# Patient Record
Sex: FEMALE | Race: WHITE | NOT HISPANIC OR LATINO | Employment: FULL TIME | URBAN - METROPOLITAN AREA
[De-identification: names, ages, dates, MRNs, and addresses within clinical notes are randomized per-mention and may not be internally consistent; named-entity substitution may affect disease eponyms.]

---

## 2020-02-10 ENCOUNTER — OFFICE VISIT (OUTPATIENT)
Dept: UROLOGY | Facility: CLINIC | Age: 55
End: 2020-02-10
Payer: COMMERCIAL

## 2020-02-10 VITALS
DIASTOLIC BLOOD PRESSURE: 65 MMHG | HEIGHT: 69 IN | SYSTOLIC BLOOD PRESSURE: 108 MMHG | BODY MASS INDEX: 20.73 KG/M2 | WEIGHT: 140 LBS | HEART RATE: 71 BPM | RESPIRATION RATE: 20 BRPM

## 2020-02-10 DIAGNOSIS — N20.0 BILATERAL KIDNEY STONES: ICD-10-CM

## 2020-02-10 PROCEDURE — 99213 PR OFFICE/OUTPT VISIT, EST, LEVL III, 20-29 MIN: ICD-10-PCS | Mod: S$GLB,,, | Performed by: NURSE PRACTITIONER

## 2020-02-10 PROCEDURE — 3008F BODY MASS INDEX DOCD: CPT | Mod: CPTII,S$GLB,, | Performed by: NURSE PRACTITIONER

## 2020-02-10 PROCEDURE — 99213 OFFICE O/P EST LOW 20 MIN: CPT | Mod: S$GLB,,, | Performed by: NURSE PRACTITIONER

## 2020-02-10 PROCEDURE — 3008F PR BODY MASS INDEX (BMI) DOCUMENTED: ICD-10-PCS | Mod: CPTII,S$GLB,, | Performed by: NURSE PRACTITIONER

## 2020-02-10 RX ORDER — HYDROCHLOROTHIAZIDE 12.5 MG/1
12.5 CAPSULE ORAL DAILY
COMMUNITY
End: 2022-08-18 | Stop reason: SDUPTHER

## 2020-02-10 RX ORDER — MELATONIN 3 MG
CAPSULE ORAL
COMMUNITY
End: 2020-12-18

## 2020-02-10 RX ORDER — OMEPRAZOLE 40 MG/1
40 CAPSULE, DELAYED RELEASE ORAL DAILY
COMMUNITY

## 2020-02-10 NOTE — PROGRESS NOTES
Subjective:       Patient ID: Beena Levy is a 54 y.o. female.    Chief Complaint: No chief complaint on file.      HPI: 54-year-old  female known patient to Dr. Galvan with a history of bilateral nonobstructing renal calculi and nonspecific left flank pain.  She was seen in the emergency room on 2020 with continued intermittent left flank pain.  CT scan again identified bilateral nonobstructing renal stones.  No hydroureter no hydronephrosis.  On presentation today she has some mild discomfort.  She is prescription NSAID for abdominal pain by her PCP.  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  She is scheduled to see Dr. Huang gastroenterology 2020 for her abdominal pain and left flank pain.  She has had no urinary tract infections since last seen.  She is voiding without difficulty, denying dysuria, frequency, urgency, incontinence or gross hematuria.  No constipation.       Past Medical History:   Past Medical History:   Diagnosis Date    Colon abnormality     GERD (gastroesophageal reflux disease)     Hypertension     Renal calculi        Past Surgical Historical:   Past Surgical History:   Procedure Laterality Date    CERVICAL SPINE SURGERY       SECTION      COLON SURGERY      LUMBAR SPINE SURGERY      URETEROLITHOTOMY          Medications:   Medication List with Changes/Refills   Current Medications    HYDROCHLOROTHIAZIDE (MICROZIDE) 12.5 MG CAPSULE    Take 12.5 mg by mouth once daily.    MELATONIN 3 MG CAP    Take by mouth.    OMEPRAZOLE (PRILOSEC) 40 MG CAPSULE    Take 40 mg by mouth once daily.        Past Social History:   Social History     Socioeconomic History    Marital status:      Spouse name: Not on file    Number of children: Not on file    Years of education: Not on file    Highest education level: Not on file   Occupational History    Not on file   Social Needs    Financial resource strain: Not on file     Food insecurity:     Worry: Not on file     Inability: Not on file    Transportation needs:     Medical: Not on file     Non-medical: Not on file   Tobacco Use    Smoking status: Never Smoker   Substance and Sexual Activity    Alcohol use: Not Currently    Drug use: Never    Sexual activity: Not on file   Lifestyle    Physical activity:     Days per week: Not on file     Minutes per session: Not on file    Stress: Not on file   Relationships    Social connections:     Talks on phone: Not on file     Gets together: Not on file     Attends Baptism service: Not on file     Active member of club or organization: Not on file     Attends meetings of clubs or organizations: Not on file     Relationship status: Not on file   Other Topics Concern    Not on file   Social History Narrative    Not on file       Allergies: Review of patient's allergies indicates:  No Known Allergies     Family History: History reviewed. No pertinent family history.     Review of Systems:  Review of Systems   Constitutional: Negative for activity change and appetite change.   HENT: Negative for congestion and dental problem.    Respiratory: Negative for chest tightness and shortness of breath.    Cardiovascular: Negative for chest pain.   Gastrointestinal: Negative for abdominal distention.   Genitourinary: Negative for decreased urine volume, difficulty urinating, dyspareunia, dysuria, enuresis, flank pain, frequency, genital sores, hematuria, pelvic pain and urgency.   Musculoskeletal: Negative for back pain and neck pain.   Allergic/Immunologic: Negative for immunocompromised state.   Neurological: Negative for dizziness.   Hematological: Negative for adenopathy.   Psychiatric/Behavioral: Negative for agitation, behavioral problems and confusion.       Physical Exam:  Physical Exam   Constitutional: She is oriented to person, place, and time. She appears well-developed and well-nourished.   HENT:   Head: Normocephalic and  atraumatic.   Eyes: No scleral icterus.   Neck: Normal range of motion.   Cardiovascular: Intact distal pulses.    Pulmonary/Chest: Effort normal and breath sounds normal.   Abdominal: Soft. She exhibits no distension. There is no tenderness.   Genitourinary: Rectum normal and vagina normal. Pelvic exam was performed with patient supine. There is no rash, tenderness or lesion on the right labia. There is no rash, tenderness or lesion on the left labia.   Musculoskeletal: She exhibits no edema.   Neurological: She is alert and oriented to person, place, and time.   Skin: Skin is warm and dry.     Psychiatric: She has a normal mood and affect.       Assessment/Plan:   Bilateral renal calculi--nonobstructive by current CT scan February 2nd 2 1.  Urinalysis today negative WBCs, 0-2 RBCs per high-power field no epi / no bacteria.  Further workup planned at this time will see patient on as needed basis prior to her next appointment in 6 months, should she become symptomatic.  Problem List Items Addressed This Visit     None

## 2020-12-11 ENCOUNTER — TELEPHONE (OUTPATIENT)
Dept: UROLOGY | Facility: CLINIC | Age: 55
End: 2020-12-11

## 2020-12-11 NOTE — TELEPHONE ENCOUNTER
"In coming fax received from the kidney clinic, [facility] regarding mutual patient, please review following imaging.  Patient complaining of left-sided flank pain". Imaging report attached was CT abdomen/pelvis performed at [facility] on 12/10/2020 revealing multiple bilateral nonobstructing renal calyceal stones measuring 2-3 mm size.  Mild left hydronephrosis secondary to a 4 mm left UPJ stone.  No renal mass or perinephric fluid collection."  Upcoming appointment noted on 12/18/2020.  "

## 2020-12-18 ENCOUNTER — OFFICE VISIT (OUTPATIENT)
Dept: UROLOGY | Facility: CLINIC | Age: 55
End: 2020-12-18
Payer: COMMERCIAL

## 2020-12-18 VITALS
HEART RATE: 82 BPM | SYSTOLIC BLOOD PRESSURE: 117 MMHG | BODY MASS INDEX: 20.73 KG/M2 | HEIGHT: 69 IN | WEIGHT: 140 LBS | DIASTOLIC BLOOD PRESSURE: 72 MMHG

## 2020-12-18 DIAGNOSIS — N20.1 OBSTRUCTION OF LEFT URETEROPELVIC JUNCTION (UPJ) DUE TO STONE: Primary | ICD-10-CM

## 2020-12-18 DIAGNOSIS — N20.0 BILATERAL KIDNEY STONES: ICD-10-CM

## 2020-12-18 PROCEDURE — 99213 OFFICE O/P EST LOW 20 MIN: CPT | Mod: S$GLB,,, | Performed by: SPECIALIST

## 2020-12-18 PROCEDURE — 3008F PR BODY MASS INDEX (BMI) DOCUMENTED: ICD-10-PCS | Mod: CPTII,S$GLB,, | Performed by: SPECIALIST

## 2020-12-18 PROCEDURE — 3008F BODY MASS INDEX DOCD: CPT | Mod: CPTII,S$GLB,, | Performed by: SPECIALIST

## 2020-12-18 PROCEDURE — 99213 PR OFFICE/OUTPT VISIT, EST, LEVL III, 20-29 MIN: ICD-10-PCS | Mod: S$GLB,,, | Performed by: SPECIALIST

## 2020-12-19 RX ORDER — TAMSULOSIN HYDROCHLORIDE 0.4 MG/1
CAPSULE ORAL
Qty: 30 CAPSULE | Refills: 0 | Status: SHIPPED | OUTPATIENT
Start: 2020-12-19 | End: 2021-01-17

## 2020-12-19 NOTE — PROGRESS NOTES
Subjective:       Patient ID: Beena PASCUAL is a 55 y.o. female.    Chief Complaint: Nephrolithiasis      HPI:  55-year-old female very pleasant, she is well known to us.  She has an extensive history of nephrolithiasis.  Her last visit with me was February 10, 2020.  She was seen in a local emergency department on 2020 with continue left intermittent left flank pain is CT scan showed bilateral nonobstructing renal calculus stone burden worse on the left than on the right.    Recently she was seen by her local nephrologist.  She continues to have intermittent left flank discomfort.  She was sent for CT imaging performed on 12/10/2020.  There was evidence of a 4 mm left UPJ obstructing calculus.  There was mild hydronephrosis.  There were multiple nonobstructing stones in the left renal calices as well as a few on the right side.  She is here today for follow-up    Her symptoms are very mild.  She does not have costovertebral angle tenderness no fevers or chills no dysuria or hematuria.    Past Medical History:   Past Medical History:   Diagnosis Date    Colon abnormality     GERD (gastroesophageal reflux disease)     Hypertension     Renal calculi        Past Surgical Historical:   Past Surgical History:   Procedure Laterality Date    CERVICAL SPINE SURGERY       SECTION      COLON SURGERY      LUMBAR SPINE SURGERY      URETEROLITHOTOMY          Medications:   Medication List with Changes/Refills   New Medications    TAMSULOSIN (FLOMAX) 0.4 MG CAP    Take 1 tablet orally at bedtime   Current Medications    HYDROCHLOROTHIAZIDE (MICROZIDE) 12.5 MG CAPSULE    Take 12.5 mg by mouth once daily.    OMEPRAZOLE (PRILOSEC) 40 MG CAPSULE    Take 40 mg by mouth once daily.   Discontinued Medications    MELATONIN 3 MG CAP    Take by mouth.        Past Social History:   Social History     Socioeconomic History    Marital status:      Spouse name: Not on file    Number of children: Not on file     Years of education: Not on file    Highest education level: Not on file   Occupational History    Not on file   Social Needs    Financial resource strain: Not on file    Food insecurity     Worry: Not on file     Inability: Not on file    Transportation needs     Medical: Not on file     Non-medical: Not on file   Tobacco Use    Smoking status: Never Smoker    Smokeless tobacco: Never Used   Substance and Sexual Activity    Alcohol use: Not Currently    Drug use: Never    Sexual activity: Not on file   Lifestyle    Physical activity     Days per week: Not on file     Minutes per session: Not on file    Stress: Not on file   Relationships    Social connections     Talks on phone: Not on file     Gets together: Not on file     Attends Islam service: Not on file     Active member of club or organization: Not on file     Attends meetings of clubs or organizations: Not on file     Relationship status: Not on file   Other Topics Concern    Not on file   Social History Narrative    Not on file       Allergies: Review of patient's allergies indicates:  No Known Allergies     Family History:   Family History   Problem Relation Age of Onset    Prostate cancer Father         Review of Systems:   systems reviewed and notable for obstructing left UPJ calculus  All other systems were reviewed Neg except as stated in the HPI    Physical Exam:  General: A&Ox3. No apparent distress. No deformities.  Neck: No masses. Normal thyroid.  Lungs: normal inspiration. No use of accessory muscles.  Heart: normal pulse. No arrhythmias.  Abdomen: Soft. NT. ND. No masses. No hernias. No hepatosplenomegaly.  Left flank pain with deep palpation  Lymphatic: Neck and groin nodes negative.  Skin: The skin is warm and dry. No jaundice.  Neurology: Cranial nerves 2-12 crossly intact, no focal weaknesses, no sensation deficits, no motor deficits  Ext: No clubbing, cyanosis or edema.  :  Deferred    Radiology review:  A CT  scan from [facility] performed on 12/10/2020 was reviewed.  Nonobstructing stones bilaterally were stone the left side.  4 mm stone in the left UPJ.    Assessment/Plan:       55-year-old female with bilateral renal calculi with a 4 mm left UPJ obstructing calculus presenting for follow-up    1.  Her symptoms mild at this point.  I would encourage expectant management including p.o. fluid intake, Flomax at bedtime, pain control as needed.  We also gave her a strainer today.  2.  The patient return to clinic in 4-6 weeks for interim check.    Problem List Items Addressed This Visit        Renal/    Bilateral kidney stones    Relevant Orders    POCT Urinalysis (w/Micro Option)      Other Visit Diagnoses     Obstruction of left ureteropelvic junction (UPJ) due to stone    -  Primary

## 2021-01-29 ENCOUNTER — OFFICE VISIT (OUTPATIENT)
Dept: UROLOGY | Facility: CLINIC | Age: 56
End: 2021-01-29
Payer: COMMERCIAL

## 2021-01-29 VITALS — HEIGHT: 69 IN | BODY MASS INDEX: 20.73 KG/M2 | WEIGHT: 140 LBS

## 2021-01-29 DIAGNOSIS — N20.1 OBSTRUCTION OF LEFT URETEROPELVIC JUNCTION (UPJ) DUE TO STONE: Primary | ICD-10-CM

## 2021-01-29 DIAGNOSIS — N20.0 BILATERAL KIDNEY STONES: ICD-10-CM

## 2021-01-29 PROCEDURE — 99213 OFFICE O/P EST LOW 20 MIN: CPT | Mod: S$GLB,,, | Performed by: SPECIALIST

## 2021-01-29 PROCEDURE — 99213 PR OFFICE/OUTPT VISIT, EST, LEVL III, 20-29 MIN: ICD-10-PCS | Mod: S$GLB,,, | Performed by: SPECIALIST

## 2021-01-29 PROCEDURE — 3008F BODY MASS INDEX DOCD: CPT | Mod: CPTII,S$GLB,, | Performed by: SPECIALIST

## 2021-01-29 PROCEDURE — 3008F PR BODY MASS INDEX (BMI) DOCUMENTED: ICD-10-PCS | Mod: CPTII,S$GLB,, | Performed by: SPECIALIST

## 2021-01-29 RX ORDER — FLUOXETINE HYDROCHLORIDE 20 MG/1
CAPSULE ORAL
COMMUNITY
Start: 2021-01-09

## 2021-01-29 RX ORDER — AZELAIC ACID 0.15 G/G
GEL TOPICAL
COMMUNITY
Start: 2020-11-30

## 2021-01-29 RX ORDER — TRAZODONE HYDROCHLORIDE 50 MG/1
TABLET ORAL
COMMUNITY
Start: 2021-01-09

## 2021-01-29 RX ORDER — HYDROCORTISONE 25 MG/G
CREAM TOPICAL
COMMUNITY
Start: 2020-11-19 | End: 2022-02-04

## 2021-01-29 RX ORDER — ARIPIPRAZOLE 2 MG/1
2 TABLET ORAL DAILY
COMMUNITY
Start: 2020-12-14

## 2021-02-18 ENCOUNTER — TELEPHONE (OUTPATIENT)
Dept: UROLOGY | Facility: CLINIC | Age: 56
End: 2021-02-18

## 2021-03-23 ENCOUNTER — TELEPHONE (OUTPATIENT)
Dept: UROLOGY | Facility: CLINIC | Age: 56
End: 2021-03-23

## 2021-03-30 ENCOUNTER — TELEPHONE (OUTPATIENT)
Dept: UROLOGY | Facility: CLINIC | Age: 56
End: 2021-03-30

## 2021-04-01 ENCOUNTER — OFFICE VISIT (OUTPATIENT)
Dept: UROLOGY | Facility: CLINIC | Age: 56
End: 2021-04-01
Payer: COMMERCIAL

## 2021-04-01 VITALS
BODY MASS INDEX: 20.73 KG/M2 | SYSTOLIC BLOOD PRESSURE: 116 MMHG | HEIGHT: 69 IN | HEART RATE: 84 BPM | WEIGHT: 140 LBS | DIASTOLIC BLOOD PRESSURE: 68 MMHG

## 2021-04-01 DIAGNOSIS — N20.0 BILATERAL KIDNEY STONES: Primary | ICD-10-CM

## 2021-04-01 PROCEDURE — 3008F PR BODY MASS INDEX (BMI) DOCUMENTED: ICD-10-PCS | Mod: CPTII,S$GLB,, | Performed by: SPECIALIST

## 2021-04-01 PROCEDURE — 99213 PR OFFICE/OUTPT VISIT, EST, LEVL III, 20-29 MIN: ICD-10-PCS | Mod: S$GLB,,, | Performed by: SPECIALIST

## 2021-04-01 PROCEDURE — 99213 OFFICE O/P EST LOW 20 MIN: CPT | Mod: S$GLB,,, | Performed by: SPECIALIST

## 2021-04-01 PROCEDURE — 1125F PR PAIN SEVERITY QUANTIFIED, PAIN PRESENT: ICD-10-PCS | Mod: S$GLB,,, | Performed by: SPECIALIST

## 2021-04-01 PROCEDURE — 1125F AMNT PAIN NOTED PAIN PRSNT: CPT | Mod: S$GLB,,, | Performed by: SPECIALIST

## 2021-04-01 PROCEDURE — 3008F BODY MASS INDEX DOCD: CPT | Mod: CPTII,S$GLB,, | Performed by: SPECIALIST

## 2022-01-06 ENCOUNTER — TELEPHONE (OUTPATIENT)
Dept: GASTROENTEROLOGY | Facility: CLINIC | Age: 57
End: 2022-01-06
Payer: COMMERCIAL

## 2022-01-06 NOTE — TELEPHONE ENCOUNTER
I spoke with the patient to discuss her results. I provided her with Dr. Huang's recommendations for therapy. She will begin her research, but would like to discuss further with Dr. Huang. Did you want to see her in clinic or call her? The best number to reach her is [phone].  ASHANTI, CMA

## 2022-01-06 NOTE — TELEPHONE ENCOUNTER
----- Message from Lety Huang MD sent at 1/4/2022  5:40 AM CST -----  Regarding: call, labs/rad, therapy  MA to notify patient. The CT scan of her GI tract looked well. The prior surgical changes were seen. The blood work is suggestive of ulcerative colitis and her stool test was positive for inflammation but her presentation and prior surgery are consistent with Crohn's colitis.  I believe we should discuss therapy options next: including Entyvio (infusions that block inflammation to the GI tract specifically), Stelara or anti-TNF (such as Humira or infliximab) (infusions and/or injections that block overall body inflammation).  I would like for her to start her research on these medicines and I can discuss it further with her if she would like or we can order the therapy. Let me know.  STEPHAN

## 2022-01-19 NOTE — TELEPHONE ENCOUNTER
Discussed therapy options with patient directly. Answered multiple questions including some of the risks. She would like to think further on therapies. Entyvio is what we are leaning towards.  She requests a call back in 6 weeks for us to get an update on her decision.  STEPHAN

## 2022-02-04 ENCOUNTER — OFFICE VISIT (OUTPATIENT)
Dept: UROLOGY | Facility: CLINIC | Age: 57
End: 2022-02-04
Payer: COMMERCIAL

## 2022-02-04 DIAGNOSIS — N20.0 NEPHROLITHIASIS: Primary | ICD-10-CM

## 2022-02-04 PROCEDURE — 1159F PR MEDICATION LIST DOCUMENTED IN MEDICAL RECORD: ICD-10-PCS | Mod: CPTII,S$GLB,, | Performed by: UROLOGY

## 2022-02-04 PROCEDURE — 1160F PR REVIEW ALL MEDS BY PRESCRIBER/CLIN PHARMACIST DOCUMENTED: ICD-10-PCS | Mod: CPTII,S$GLB,, | Performed by: UROLOGY

## 2022-02-04 PROCEDURE — 99213 PR OFFICE/OUTPT VISIT, EST, LEVL III, 20-29 MIN: ICD-10-PCS | Mod: S$GLB,,, | Performed by: UROLOGY

## 2022-02-04 PROCEDURE — 1160F RVW MEDS BY RX/DR IN RCRD: CPT | Mod: CPTII,S$GLB,, | Performed by: UROLOGY

## 2022-02-04 PROCEDURE — 99213 OFFICE O/P EST LOW 20 MIN: CPT | Mod: S$GLB,,, | Performed by: UROLOGY

## 2022-02-04 PROCEDURE — 1159F MED LIST DOCD IN RCRD: CPT | Mod: CPTII,S$GLB,, | Performed by: UROLOGY

## 2022-02-04 RX ORDER — BUPROPION HYDROCHLORIDE 300 MG/1
TABLET ORAL
COMMUNITY
Start: 2022-01-19

## 2022-02-04 RX ORDER — ELECTROLYTES/DEXTROSE
1 SOLUTION, ORAL ORAL DAILY
COMMUNITY

## 2022-02-04 NOTE — PROGRESS NOTES
Subjective:       Patient ID: Beena PASCUAL is a 56 y.o. female.    Chief Complaint: Nephrolithiasis (Hx of, no recent problems/complaints )      HPI:  56-year-old female with a history of nephrolithiasis she has had ureteroscopy in the past currently the patient is asymptomatic and not having any issues    Past Medical History:   Past Medical History:   Diagnosis Date    Colon abnormality     GERD (gastroesophageal reflux disease)     Hypertension     Renal calculi        Past Surgical Historical:   Past Surgical History:   Procedure Laterality Date    CERVICAL SPINE SURGERY       SECTION      COLON SURGERY      COLONOSCOPY W/ BIOPSIES  10/2021    LUMBAR SPINE SURGERY      URETEROLITHOTOMY          Medications:   Medication List with Changes/Refills   Current Medications    ARIPIPRAZOLE (ABILIFY) 2 MG TAB    Take 2 mg by mouth once daily.    AZELAIC ACID (AZELEX) 15 % GEL        BIOTIN 5 MG CAP    Take 1 capsule by mouth once daily.    BUPROPION (WELLBUTRIN XL) 300 MG 24 HR TABLET        FLUOXETINE 20 MG CAPSULE        HYDROCHLOROTHIAZIDE (MICROZIDE) 12.5 MG CAPSULE    Take 12.5 mg by mouth once daily.    MULTIVITAMIN CAPSULE    Take 1 capsule by mouth once daily.    OMEPRAZOLE (PRILOSEC) 40 MG CAPSULE    Take 40 mg by mouth once daily.    TRAZODONE (DESYREL) 50 MG TABLET       Discontinued Medications    HYDROCORTISONE (ANUSOL-HC) 2.5 % RECTAL CREAM    APPLY A SMALL AMOUNT VIA APPLICATOR BID    TAMSULOSIN (FLOMAX) 0.4 MG CAP    TAKE 1 CAPSULE BY MOUTH AT BEDTIME        Past Social History:   Social History     Socioeconomic History    Marital status:    Tobacco Use    Smoking status: Never Smoker    Smokeless tobacco: Never Used   Substance and Sexual Activity    Alcohol use: Not Currently    Drug use: Never       Allergies: Review of patient's allergies indicates:  No Known Allergies     Family History:   Family History   Problem Relation Age of Onset    Prostate cancer Father          Review of Systems:  Review of Systems   Constitutional: Negative for activity change and appetite change.   HENT: Negative for congestion and dental problem.    Respiratory: Negative for chest tightness and shortness of breath.    Cardiovascular: Negative for chest pain.   Gastrointestinal: Negative for abdominal distention and abdominal pain.   Genitourinary: Negative for decreased urine volume, difficulty urinating, dyspareunia, dysuria, enuresis, flank pain, frequency, genital sores, hematuria, pelvic pain and urgency.   Musculoskeletal: Negative for back pain and neck pain.   Allergic/Immunologic: Negative for immunocompromised state.   Neurological: Negative for dizziness.   Hematological: Negative for adenopathy.   Psychiatric/Behavioral: Negative for agitation, behavioral problems and confusion.       Physical Exam:  Physical Exam  Constitutional:       Appearance: She is well-developed and well-nourished.   HENT:      Head: Normocephalic and atraumatic.      Right Ear: External ear normal.      Left Ear: External ear normal.   Eyes:      Extraocular Movements: EOM normal.      Conjunctiva/sclera: Conjunctivae normal.   Neck:      Vascular: No JVD.   Cardiovascular:      Rate and Rhythm: Normal rate and regular rhythm.   Pulmonary:      Effort: Pulmonary effort is normal. No respiratory distress.      Breath sounds: Normal breath sounds. No wheezing.   Abdominal:      General: There is no distension.      Palpations: Abdomen is soft.      Tenderness: There is no abdominal tenderness. There is no rebound.   Musculoskeletal:         General: Normal range of motion.      Cervical back: Normal range of motion.   Skin:     General: Skin is warm and dry.      Findings: No erythema or rash.   Neurological:      Mental Status: She is alert and oriented to person, place, and time.   Psychiatric:         Mood and Affect: Mood and affect normal.         Behavior: Behavior normal.         Assessment/Plan:        Problem List Items Addressed This Visit    None     Visit Diagnoses     Nephrolithiasis    -  Primary             Nephrolithiasis:  Patient has known stones however there asymptomatic we made the decision to hold off on any intervention until the begin to bother her patient understands she come back to clinic whenever she needs will set an appointment for 1 year

## 2022-02-17 ENCOUNTER — TELEPHONE (OUTPATIENT)
Dept: UROLOGY | Facility: CLINIC | Age: 57
End: 2022-02-17
Payer: COMMERCIAL

## 2022-02-17 NOTE — TELEPHONE ENCOUNTER
Left message that dr benítez does not go to [facility] but to report to [facility]. [facility]

## 2022-03-25 NOTE — TELEPHONE ENCOUNTER
I spoke with the patient who states that she has decided to see a doctor at [facility] for a 2nd opinion.  JACKL, CMA

## 2022-05-26 ENCOUNTER — TELEPHONE (OUTPATIENT)
Dept: UROLOGY | Facility: CLINIC | Age: 57
End: 2022-05-26
Payer: COMMERCIAL

## 2022-05-26 NOTE — TELEPHONE ENCOUNTER
Spoke with pt and informed her that Dr. Hair does not go to [facility] and he is out today. I advised her if they can not find anyone to place a stent then she will need to call us and we can get her scheduled to see Dr. Hair in clinic.     ----- Message from Mirta Isaac sent at 5/26/2022  8:15 AM CDT -----  Type:  Needs Medical Advice    Who Called: Beena PASCUAL    Symptoms (please be specific): 6.1 mm Kidney stone (obstructing ) Nephrosis  ( Hospitalized )     How long has patient had these symptoms:  -  Pharmacy name and phone #:  -  Would the patient rather a call back or a response via [facility]?    Best Call Back Number: [phone]  Additional Information:  pt wants to know if Dr Hair comes to [facility], (she needs a stent placed) please call to advise

## 2022-06-01 ENCOUNTER — TELEPHONE (OUTPATIENT)
Dept: UROLOGY | Facility: CLINIC | Age: 57
End: 2022-06-01
Payer: COMMERCIAL

## 2022-06-01 NOTE — TELEPHONE ENCOUNTER
Callback to pt, advised that Laxmi is affiliated with [facility] and [facility] ([facility]). She states that she was admitted at [facility] for a 6.2 stone, and a stent has been placed. She said that in the past she had a incident where the wrong size stent was placed, and the pain she's having is the same pain she had at the time of incident. Laxmi is her provider and she wants to know if she should come here or continue to see Selam Friday for f/u. Discussed with Nitza, we think it would be best if she proceeds to see Selam due to us possibly not being able to get her in this week. Pt verbalized understanding. Advised to keep Laxmi in the loop of things. BJP    ----- Message from Quintin Casas sent at 6/1/2022  8:27 AM CDT -----  Contact: Patient  Patient need the nurse to call her.  Patient want to know what hospital Dr Hair is affiliated with and she has some more questions      Call back #  [phone]

## 2022-06-21 ENCOUNTER — TELEPHONE (OUTPATIENT)
Dept: UROLOGY | Facility: CLINIC | Age: 57
End: 2022-06-21
Payer: COMMERCIAL

## 2022-06-21 NOTE — TELEPHONE ENCOUNTER
Pt scheduled.     ----- Message from Juanita Larkin MA sent at 6/21/2022  4:05 PM CDT -----  Contact: self    ----- Message -----  From: Janny Bertrand  Sent: 6/20/2022   4:59 PM CDT  To: Juanita Larkin MA      ----- Message -----  From: Rafaela Sawyer  Sent: 6/20/2022  10:00 AM CDT  To: Laxmi Nicole Staff    Patient called and asked for a call back regarding 3 ER visits since her last visit. Please call [phone]

## 2022-07-12 ENCOUNTER — OFFICE VISIT (OUTPATIENT)
Dept: UROLOGY | Facility: CLINIC | Age: 57
End: 2022-07-12
Payer: COMMERCIAL

## 2022-07-12 VITALS
DIASTOLIC BLOOD PRESSURE: 60 MMHG | HEIGHT: 69 IN | SYSTOLIC BLOOD PRESSURE: 125 MMHG | BODY MASS INDEX: 20.73 KG/M2 | HEART RATE: 87 BPM | WEIGHT: 140 LBS | RESPIRATION RATE: 18 BRPM | TEMPERATURE: 98 F

## 2022-07-12 DIAGNOSIS — N20.0 NEPHROLITHIASIS: Primary | ICD-10-CM

## 2022-07-12 PROCEDURE — 3078F DIAST BP <80 MM HG: CPT | Mod: CPTII,S$GLB,, | Performed by: UROLOGY

## 2022-07-12 PROCEDURE — 3078F PR MOST RECENT DIASTOLIC BLOOD PRESSURE < 80 MM HG: ICD-10-PCS | Mod: CPTII,S$GLB,, | Performed by: UROLOGY

## 2022-07-12 PROCEDURE — 1159F MED LIST DOCD IN RCRD: CPT | Mod: CPTII,S$GLB,, | Performed by: UROLOGY

## 2022-07-12 PROCEDURE — 3008F PR BODY MASS INDEX (BMI) DOCUMENTED: ICD-10-PCS | Mod: CPTII,S$GLB,, | Performed by: UROLOGY

## 2022-07-12 PROCEDURE — 3074F PR MOST RECENT SYSTOLIC BLOOD PRESSURE < 130 MM HG: ICD-10-PCS | Mod: CPTII,S$GLB,, | Performed by: UROLOGY

## 2022-07-12 PROCEDURE — 1160F RVW MEDS BY RX/DR IN RCRD: CPT | Mod: CPTII,S$GLB,, | Performed by: UROLOGY

## 2022-07-12 PROCEDURE — 3074F SYST BP LT 130 MM HG: CPT | Mod: CPTII,S$GLB,, | Performed by: UROLOGY

## 2022-07-12 PROCEDURE — 3008F BODY MASS INDEX DOCD: CPT | Mod: CPTII,S$GLB,, | Performed by: UROLOGY

## 2022-07-12 PROCEDURE — 99214 OFFICE O/P EST MOD 30 MIN: CPT | Mod: S$GLB,,, | Performed by: UROLOGY

## 2022-07-12 PROCEDURE — 99214 PR OFFICE/OUTPT VISIT, EST, LEVL IV, 30-39 MIN: ICD-10-PCS | Mod: S$GLB,,, | Performed by: UROLOGY

## 2022-07-12 PROCEDURE — 1160F PR REVIEW ALL MEDS BY PRESCRIBER/CLIN PHARMACIST DOCUMENTED: ICD-10-PCS | Mod: CPTII,S$GLB,, | Performed by: UROLOGY

## 2022-07-12 PROCEDURE — 1159F PR MEDICATION LIST DOCUMENTED IN MEDICAL RECORD: ICD-10-PCS | Mod: CPTII,S$GLB,, | Performed by: UROLOGY

## 2022-07-12 RX ORDER — MESALAMINE 1.2 G/1
TABLET, DELAYED RELEASE ORAL
COMMUNITY
Start: 2022-06-21 | End: 2022-08-18 | Stop reason: SDUPTHER

## 2022-07-12 NOTE — PROGRESS NOTES
Subjective:       Patient ID: Beena PASCUAL is a 57 y.o. female.    Chief Complaint: Nephrolithiasis (Left 6.4mm stone, stent placed about a month ago by mariana at [facility] )      HPI:  57-year-old female with a history of stones she was recently seen in the ER at [facility] there was no urologist there she was transferred to [facility] Dr. Ramirez smith did ureteroscopy on her later removed her stent she is here for follow-up    Past Medical History:   Past Medical History:   Diagnosis Date    Colon abnormality     GERD (gastroesophageal reflux disease)     Hypertension     Renal calculi        Past Surgical Historical:   Past Surgical History:   Procedure Laterality Date    CERVICAL SPINE SURGERY       SECTION      COLON SURGERY      COLONOSCOPY W/ BIOPSIES  10/2021    LUMBAR SPINE SURGERY      URETEROLITHOTOMY          Medications:   Medication List with Changes/Refills   Current Medications    ARIPIPRAZOLE (ABILIFY) 2 MG TAB    Take 2 mg by mouth once daily.    AZELAIC ACID (AZELEX) 15 % GEL        BIOTIN 5 MG CAP    Take 1 capsule by mouth once daily.    BUPROPION (WELLBUTRIN XL) 300 MG 24 HR TABLET        FLUOXETINE 20 MG CAPSULE        HYDROCHLOROTHIAZIDE (MICROZIDE) 12.5 MG CAPSULE    Take 12.5 mg by mouth once daily.    MESALAMINE (LIALDA) 1.2 GRAM TBEC        MULTIVITAMIN CAPSULE    Take 1 capsule by mouth once daily.    OMEPRAZOLE (PRILOSEC) 40 MG CAPSULE    Take 40 mg by mouth once daily.    TRAZODONE (DESYREL) 50 MG TABLET            Past Social History:   Social History     Socioeconomic History    Marital status:    Tobacco Use    Smoking status: Never Smoker    Smokeless tobacco: Never Used   Substance and Sexual Activity    Alcohol use: Not Currently    Drug use: Never       Allergies: Review of patient's allergies indicates:  No Known Allergies     Family History:   Family History   Problem Relation Age of Onset    Prostate cancer Father         Review of Systems:  Review of  Systems   Constitutional: Negative for activity change and appetite change.   HENT: Negative for congestion and dental problem.    Respiratory: Negative for chest tightness and shortness of breath.    Cardiovascular: Negative for chest pain.   Gastrointestinal: Negative for abdominal distention and abdominal pain.   Genitourinary: Negative for decreased urine volume, difficulty urinating, dyspareunia, dysuria, enuresis, flank pain, frequency, genital sores, hematuria, pelvic pain and urgency.   Musculoskeletal: Negative for back pain and neck pain.   Allergic/Immunologic: Negative for immunocompromised state.   Neurological: Negative for dizziness.   Hematological: Negative for adenopathy.   Psychiatric/Behavioral: Negative for agitation, behavioral problems and confusion.       Physical Exam:  Physical Exam  Constitutional:       Appearance: She is well-developed.   HENT:      Head: Normocephalic and atraumatic.      Right Ear: External ear normal.      Left Ear: External ear normal.   Eyes:      Conjunctiva/sclera: Conjunctivae normal.   Neck:      Vascular: No JVD.   Cardiovascular:      Rate and Rhythm: Normal rate and regular rhythm.   Pulmonary:      Effort: Pulmonary effort is normal. No respiratory distress.      Breath sounds: Normal breath sounds. No wheezing.   Abdominal:      General: There is no distension.      Palpations: Abdomen is soft.      Tenderness: There is no abdominal tenderness. There is no rebound.   Musculoskeletal:         General: Normal range of motion.      Cervical back: Normal range of motion.   Skin:     General: Skin is warm and dry.      Findings: No erythema or rash.   Neurological:      Mental Status: She is alert and oriented to person, place, and time.   Psychiatric:         Behavior: Behavior normal.         Assessment/Plan:       Problem List Items Addressed This Visit    None     Visit Diagnoses     Nephrolithiasis    -  Primary             Nephrolithiasis:  I have reviewed  her CT images she has a right-sided 4 mm stone in the lower pole I offered to do ureteroscopy versus ESWL to remove those patient would like to hold off on any intervention at this time

## 2022-08-18 ENCOUNTER — HOSPITAL ENCOUNTER (OUTPATIENT)
Dept: RADIOLOGY | Facility: CLINIC | Age: 57
Discharge: HOME | End: 2022-08-18
Attending: NURSE PRACTITIONER
Payer: COMMERCIAL

## 2022-08-18 ENCOUNTER — PATIENT MESSAGE (OUTPATIENT)
Dept: UROLOGY | Facility: CLINIC | Age: 57
End: 2022-08-18

## 2022-08-18 ENCOUNTER — CLINICAL SUPPORT (OUTPATIENT)
Dept: UROLOGY | Facility: CLINIC | Age: 57
End: 2022-08-18
Payer: COMMERCIAL

## 2022-08-18 ENCOUNTER — TELEPHONE (OUTPATIENT)
Dept: UROLOGY | Facility: CLINIC | Age: 57
End: 2022-08-18

## 2022-08-18 DIAGNOSIS — N20.0 NEPHROLITHIASIS: ICD-10-CM

## 2022-08-18 DIAGNOSIS — R82.90 ABNORMAL URINALYSIS: Primary | ICD-10-CM

## 2022-08-18 PROCEDURE — 99214 PR OFFICE/OUTPT VISIT, EST, LEVL IV, 30-39 MIN: ICD-10-PCS | Mod: S$GLB,,, | Performed by: NURSE PRACTITIONER

## 2022-08-18 PROCEDURE — 74018 XR ABDOMEN AP 1 VIEW: ICD-10-PCS | Mod: TC,,, | Performed by: UROLOGY

## 2022-08-18 PROCEDURE — 74018 RADEX ABDOMEN 1 VIEW: CPT | Mod: TC,,, | Performed by: UROLOGY

## 2022-08-18 PROCEDURE — 74018 XR ABDOMEN AP 1 VIEW: ICD-10-PCS | Mod: 26,,, | Performed by: RADIOLOGY

## 2022-08-18 PROCEDURE — 74018 RADEX ABDOMEN 1 VIEW: CPT | Mod: 26,,, | Performed by: RADIOLOGY

## 2022-08-18 PROCEDURE — 99214 OFFICE O/P EST MOD 30 MIN: CPT | Mod: S$GLB,,, | Performed by: NURSE PRACTITIONER

## 2022-08-18 RX ORDER — POLYETHYLENE GLYCOL 3350, SODIUM SULFATE ANHYDROUS, SODIUM BICARBONATE, SODIUM CHLORIDE, POTASSIUM CHLORIDE 236; 22.74; 6.74; 5.86; 2.97 G/4L; G/4L; G/4L; G/4L; G/4L
POWDER, FOR SOLUTION ORAL
COMMUNITY
Start: 2022-07-18 | End: 2023-12-05

## 2022-08-18 RX ORDER — MESALAMINE 1.2 G/1
2400 TABLET, DELAYED RELEASE ORAL
COMMUNITY
Start: 2022-08-08 | End: 2022-09-07

## 2022-08-18 RX ORDER — PHENAZOPYRIDINE HYDROCHLORIDE 200 MG/1
200 TABLET, FILM COATED ORAL 3 TIMES DAILY PRN
Qty: 30 TABLET | Refills: 0 | Status: SHIPPED | OUTPATIENT
Start: 2022-08-18 | End: 2022-08-28

## 2022-08-18 RX ORDER — COVID-19 MOLECULAR TEST ASSAY
KIT MISCELLANEOUS
COMMUNITY
Start: 2022-08-04

## 2022-08-18 RX ORDER — HYDROCHLOROTHIAZIDE 12.5 MG/1
TABLET ORAL
COMMUNITY
Start: 2022-08-05 | End: 2023-12-05

## 2022-08-18 RX ORDER — POLYETHYLENE GLYCOL-3350 AND ELECTROLYTES 236; 6.74; 5.86; 2.97; 22.74 G/274.31G; G/274.31G; G/274.31G; G/274.31G; G/274.31G
POWDER, FOR SOLUTION ORAL
COMMUNITY
Start: 2022-07-18 | End: 2023-12-05

## 2022-08-18 RX ORDER — NITROFURANTOIN MACROCRYSTALS 50 MG/1
100 CAPSULE ORAL EVERY 12 HOURS
Qty: 20 CAPSULE | Refills: 0 | Status: SHIPPED | OUTPATIENT
Start: 2022-08-18 | End: 2022-08-28

## 2022-08-18 NOTE — TELEPHONE ENCOUNTER
----- Message from Quintin Casas sent at 8/18/2022  1:48 PM CDT -----  Contact: [phone]  Please call Nitish from [facility] regarding the below Rx      nitrofurantoin (MACRODANTIN) 50 MG capsule                          .  [facility] - [place] - [place], [place] - [address] [address]  Phone: [phone] Fax: [phone]

## 2022-08-18 NOTE — TELEPHONE ENCOUNTER
Nitish at pharmacy wants clarification, macrobid is q12hrs, marcodantin is q6. Current order is nitrofurantoin (macrodantin) 50mg, take 2 caps every 12hrs for 10 days. I read current order to him but still requesting to clarify.

## 2022-08-18 NOTE — PROGRESS NOTES
Subjective:       Patient ID: Beena PASCUAL is a 57 y.o. female.    Chief Complaint: Urinary Tract Infection and Flank Pain      HPI: 57-year-old female work-in today for complaints of dysuria and mild right flank discomfort.  Onset 3:00 a.m. today.  She is afebrile.  Has seen no obvious blood in the urine.  Past medical history of renal calculi.       Past Medical History:   Past Medical History:   Diagnosis Date    Colon abnormality     GERD (gastroesophageal reflux disease)     Hypertension     Renal calculi        Past Surgical Historical:   Past Surgical History:   Procedure Laterality Date    CERVICAL SPINE SURGERY       SECTION      COLON SURGERY      COLONOSCOPY W/ BIOPSIES  10/2021    LUMBAR SPINE SURGERY      URETEROLITHOTOMY          Medications:   Medication List with Changes/Refills   New Medications    NITROFURANTOIN (MACRODANTIN) 50 MG CAPSULE    Take 2 capsules (100 mg total) by mouth every 12 (twelve) hours. for 10 days    PHENAZOPYRIDINE (PYRIDIUM) 200 MG TABLET    Take 1 tablet (200 mg total) by mouth 3 (three) times daily as needed for Pain.   Current Medications    ARIPIPRAZOLE (ABILIFY) 2 MG TAB    Take 2 mg by mouth once daily.    AZELAIC ACID (AZELEX) 15 % GEL        BINAXNOW COVID-19 AG SELF TEST KIT    TEST AS DIRECTED TODAY    BIOTIN 5 MG CAP    Take 1 capsule by mouth once daily.    BUPROPION (WELLBUTRIN XL) 300 MG 24 HR TABLET        FLUOXETINE 20 MG CAPSULE        GAVILYTE-G 236-22.74-6.74 -5.86 GRAM SUSPENSION        HYDROCHLOROTHIAZIDE (HYDRODIURIL) 12.5 MG TAB        MESALAMINE (LIALDA) 1.2 GRAM TBEC    Take 2,400 mg by mouth.    MULTIVITAMIN CAPSULE    Take 1 capsule by mouth once daily.    OMEPRAZOLE (PRILOSEC) 40 MG CAPSULE    Take 40 mg by mouth once daily.    POLYETHYLENE GLYCOL (GOLYTELY) 236-22.74-6.74 -5.86 GRAM SUSPENSION    Use as directed by ordering provider.    TRAZODONE (DESYREL) 50 MG TABLET       Discontinued Medications    HYDROCHLOROTHIAZIDE  (MICROZIDE) 12.5 MG CAPSULE    Take 12.5 mg by mouth once daily.    MESALAMINE (LIALDA) 1.2 GRAM TBEC            Past Social History:   Social History     Socioeconomic History    Marital status:    Tobacco Use    Smoking status: Never Smoker    Smokeless tobacco: Never Used   Substance and Sexual Activity    Alcohol use: Not Currently    Drug use: Never       Allergies: Review of patient's allergies indicates:  No Known Allergies     Family History:   Family History   Problem Relation Age of Onset    Prostate cancer Father         Review of Systems:  Review of Systems   Constitutional: Negative for activity change and appetite change.   HENT: Negative for congestion and dental problem.    Respiratory: Negative for chest tightness and shortness of breath.    Cardiovascular: Negative for chest pain.   Gastrointestinal: Negative for abdominal distention and abdominal pain.   Genitourinary: Positive for dysuria. Negative for decreased urine volume, difficulty urinating, dyspareunia, enuresis, flank pain, frequency, genital sores, hematuria, pelvic pain and urgency.   Musculoskeletal: Negative for back pain and neck pain.   Allergic/Immunologic: Negative for immunocompromised state.   Neurological: Negative for dizziness.   Hematological: Negative for adenopathy.   Psychiatric/Behavioral: Negative for agitation, behavioral problems and confusion.       Physical Exam:  Physical Exam  Constitutional:       Appearance: She is well-developed.   HENT:      Head: Normocephalic and atraumatic.   Eyes:      General: No scleral icterus.  Pulmonary:      Effort: Pulmonary effort is normal.      Breath sounds: Normal breath sounds.   Abdominal:      General: There is no distension.      Palpations: Abdomen is soft.      Tenderness: There is no abdominal tenderness.   Genitourinary:     Exam position: Supine.      Labia:         Right: No rash, tenderness or lesion.         Left: No rash, tenderness or lesion.        Vagina: Normal.      Rectum: Normal.   Musculoskeletal:      Cervical back: Normal range of motion.   Skin:     General: Skin is warm and dry.   Neurological:      Mental Status: She is alert and oriented to person, place, and time.         Assessment/Plan:   Dysuria--urinalysis WBC 50-60, RBC 25-30, no skin cells 2+ bacteria nitrite negative.  Urine is cloudy.  Culture urine.  Rx Macrobid/Pyridium pending culture results.    Kidney stone by history--KUB today small questionable of lower pole calyceal stone on the right renal.  Suboptimal of visualization of the upper tracts secondary to overlying stool and bowel gas.  Problem List Items Addressed This Visit    None     Visit Diagnoses     Abnormal urinalysis    -  Primary    Relevant Orders    POCT Urinalysis (w/Micro Option)    X-Ray Abdomen AP 1 View (Completed)    Urine culture    Nephrolithiasis        Relevant Orders    X-Ray Abdomen AP 1 View (Completed)

## 2022-08-20 LAB — URINE CULTURE, ROUTINE: NORMAL

## 2022-08-22 ENCOUNTER — TELEPHONE (OUTPATIENT)
Dept: UROLOGY | Facility: CLINIC | Age: 57
End: 2022-08-22
Payer: COMMERCIAL

## 2022-08-22 NOTE — TELEPHONE ENCOUNTER
Patient notified of culture results. Patient was started on antibiotics, she stated making her feel better will complete them. [facility] LPN    ----- Message from Jacobo Villafuerte NP sent at 8/22/2022  5:00 PM CDT -----  No growth on urine culture.

## 2023-02-02 ENCOUNTER — CLINICAL SUPPORT (OUTPATIENT)
Dept: UROLOGY | Facility: CLINIC | Age: 58
End: 2023-02-02
Payer: COMMERCIAL

## 2023-02-02 ENCOUNTER — TELEPHONE (OUTPATIENT)
Dept: UROLOGY | Facility: CLINIC | Age: 58
End: 2023-02-02

## 2023-02-02 DIAGNOSIS — R30.0 DYSURIA: Primary | ICD-10-CM

## 2023-02-02 NOTE — TELEPHONE ENCOUNTER
Patient feels she is starting with a UTI, sched for [facility] drop [facility] today. [facility]

## 2023-02-02 NOTE — PROGRESS NOTES
[facility] ambulatory encounter  FAMILY PRACTICE OFFICE VISIT    CHIEF COMPLAINT:    Chief Complaint   Patient presents with   â¢ ER F/U     headaches    â¢ Chest Pain   â¢ Shoulder Pain   â¢ Numbness   â¢ Tingling       SUBJECTIVE:  Charlie Rehman is a 79year old female who presented requesting evaluation for a multiple medical problems. 1. Left hand/elbow pain: The patient presents for follow up of this new problem. Patient states she began experiencing pain radiating from elbow to hand yesterday. Admits the pain is worse in her hand and she is having difficulty moving it. Reports numbness/tingling and swelling, states she is unable to use her walker because of this. Patient has no other symptoms. Need for additional testing discussed, patient has no further concerns at this time. 2. Hypertensive heart disease: The patient presents for a routine follow-up for this medical problem. She is doing well, clinically stable. Denies any chest pain, headaches or shortness of breath. She is tolerating current medication of Imdur 60 mg daily and metoprolol 25 mg twice daily with no concerns. Patient has no new problems at this time. 3. History of pulmonary embolism: The patient presents for a routine follow-up for this medical problem. She is doing well, clinically stable. Denies any shortness of breath. She is tolerating current medication of Coumadin 3.5 mg daily with no concerns. Denies missed or extra doses, abnormal bruising or bleeding, and changes in diet. Need for close INR monitoring due to antibiotic treatment discussed. Patient has no new problems at this time. 4. Hypercholesterolemia: The patient is here for a follow up on this chronic problem. Patient states that she is doing well. The patient is tolerating her current medication regimen of pravastatin 60 mg daily with no problems. Patient has no new concerns at this time. Review of systems:   Respiratory: Negative for shortness of breath.     Cardiovascular: Pt proceeded to [facility] for ua drop off. Ua shows small infection. We will culture and inform of results.          Negative for chest pain or chest pressure. Extremities: Positive for joint pain and swelling. OBJECTIVE:  PROBLEM LIST:   Patient Active Problem List   Diagnosis   â¢ Pulmonary embolism (CMS/HCC)   â¢ Backache, unspecified   â¢ Edema   â¢ Disorder of bone and cartilage, unspecified   â¢ Diverticulosis of colon (without mention of hemorrhage)   â¢ Synovitis of knee   â¢ Chronic kidney disease, stage III (moderate) (CMS/HCC)   â¢ Obesity (BMI 30.0-34.9)   â¢ Essential hypertension, benign   â¢ Unspecified hypothyroidism   â¢ Chronic anticoagulation   â¢ Cervical radiculopathy   â¢ Carpal tunnel syndrome   â¢ Generalized osteoarthrosis, unspecified site   â¢ Anemia, unspecified   â¢ Antral ulcer   â¢ Melena   â¢ Iron (Fe) deficiency anemia   â¢ Cancer of right colon (CMS/HCC)   â¢ Encounter for therapeutic drug monitoring [Z51.81]   â¢ Long term (current) use of anticoagulants [Z79.01]   â¢ Chalazion of right lower eyelid   â¢ Macular hemorrhage of left eye   â¢ Glaucoma suspect of right eye   â¢ Exotropia of left eye       PAST HISTORIES:   I have reviewed the past medical history, family history, social history, medications and allergies listed in the medical record as obtained by my nursing staff and support staff and agree with their documentation. ALLERGIES:   Allergen Reactions   â¢ Codeine RASH   â¢ Dye [Contrast Media] SWELLING     Current Outpatient Medications   Medication Sig Dispense Refill   â¢ HYDROcodone-acetaminophen (NORCO) 5-325 MG per tablet Take 1 tablet by mouth every 6 hours as needed for Pain. 12 tablet 0   â¢ metOLazone (ZAROXOLYN) 2.5 MG tablet TAKE ONE TABLET BY MOUTH ON TUESDAY AND FRIDAY 24 tablet 1   â¢ furosemide (LASIX) 40 MG tablet TAKE ONE AND ONE-HALF TABLETS BY MOUTH ONCE DAILY 45 tablet 11   â¢ warfarin (COUMADIN) 1 MG tablet Take 4 tablets by mouth daily.  360 tablet 1   â¢ Multiple Vitamins-Minerals (THERA-M) Tab TAKE ONE TABLET BY MOUTH ONCE DAILY 90 tablet 11   â¢ ascorbic acid (VITAMIN C) 250 MG tablet TAKE ONE TABLET BY MOUTH ONCE DAILY 90 tablet 11   â¢ Calcium Carbonate 1500 (600 Ca) MG Tab TAKE ONE TABLET BY MOUTH TWICE A  tablet 11   â¢ pravastatin (PRAVACHOL) 40 MG tablet TAKE ONE AND ONE-HALF TABLETS BY MOUTH ONCE DAILY 135 tablet 6   â¢ Omega-3 Fatty Acids (FISH OIL) 1000 MG capsule TAKE ONE CAPSULE BY MOUTH ONCE DAILY 90 capsule 11   â¢ omeprazole (PRILOSEC) 40 MG capsule TAKE ONE CAPSULE BY MOUTH ONCE DAILY 90 capsule 3   â¢ Magnesium Oxide 400 (240 Mg) MG Tab TAKE ONE TABLET BY MOUTH TWICE A  tablet 3   â¢ digoxin (LANOXIN) 0.125 MG tablet TAKE ONE TABLET BY MOUTH ONCE DAILY 90 tablet 3   â¢ ferrous sulfate 325 (65 FE) MG tablet TAKE ONE TABLET BY MOUTH ONCE DAILY WITH BREAKFAST 90 tablet 3   â¢ levothyroxine (SYNTHROID, LEVOTHROID) 150 MCG tablet TAKE ONE TABLET BY MOUTH ONCE DAILY 90 tablet 3   â¢ isosorbide mononitrate (IMDUR) 60 MG 24 hr tablet TAKE ONE TABLET BY MOUTH ONCE DAILY 90 tablet 3   â¢ metoPROLOL tartrate (LOPRESSOR) 25 MG tablet TAKE ONE-HALF TABLET BY MOUTH TWICE A DAY 90 tablet 3   â¢ POLY-IRON 150 150 MG capsule TAKE ONE CAPSULE BY MOUTH ONCE DAILY 90 capsule 3   â¢ potassium chloride (K-DUR,KLOR-CON) 20 MEQ CR tablet TAKE ONE TABLET BY MOUTH ONCE DAILY 90 tablet 3   â¢ polyethylene glycol (MIRALAX) powder Take 17 g by mouth daily. 255 g 3   â¢ Calcium 600 MG tablet Take 1 tablet by mouth 2 times daily. 180 tablet 3   â¢ predniSONE (DELTASONE) 20 MG tablet Take 1 tablet by mouth 2 times daily for 5 days. 10 tablet 0   â¢ cefadroxil (DURICEF) 500 MG capsule Take 1 capsule by mouth 2 times daily. 14 capsule 0   â¢ erythromycin (ILOTYCIN) ophthalmic ointment Apply to affected eye twice daily 3.5 g 0   â¢ acetaminophen (TYLENOL) 325 MG tablet Take 2 tablets by mouth every 6 hours as needed for Pain. 30 tablet 0     No current facility-administered medications for this visit.       Immunization History   Administered Date(s) Administered   â¢ Influenza, Unspecified Formulation 10/01/2017   â¢ Influenza, high dose seasonal, preservative-free 10/08/2015, 10/13/2016, 09/13/2018   â¢ Influenza, seasonal, injectable, trivalent 10/18/1999, 10/08/2010, 10/11/2011, 10/17/2013, 10/06/2014   â¢ Pneumococcal Conjugate 13 valent 10/08/2015   â¢ Pneumococcal polysaccharide, adult, 23 valent 10/26/2011   â¢ Tdap 02/18/2016     Past Medical History:   Diagnosis Date   â¢ Anemia    â¢ Arthritis     back and shoulder   â¢ Basal cell carcinoma     left forehead   â¢ Blood clot associated with vein wall inflammation    â¢ CAD (coronary artery disease)    â¢ Carpal tunnel syndrome 8/12/2013   â¢ Cataracts, bilateral    â¢ Cervical radiculopathy 8/12/2013   â¢ Diverticulitis    â¢ DVT (deep venous thrombosis) (CMS/[facility])    â¢ Dyslipidemia    â¢ 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   â¢ Full dentures    â¢ GERD (gastroesophageal reflux disease)    â¢ Hypertension    â¢ Hypothyroidism    â¢ Low back pain    â¢ Malignant neoplasm of cecum (CMS/[facility])    â¢ Obesity    â¢ Orthostatic hypotension    â¢ Osteoarthritis    â¢ Osteoporosis, unspecified 10/06/2011   â¢ Paroxysmal atrial fibrillation (CMS/[facility])    â¢ PE (pulmonary embolism)    â¢ Ptosis, both eyelids    â¢ Shingles    â¢ Use of cane as ambulatory aid    â¢ UTI (urinary tract infection)    â¢ Wears eyeglasses      Past Surgical History:   Procedure Laterality Date   â¢ Breast surgery Left approx. 2013   â¢ Cabg, vein, three  approx. 2010    CABG, 3 veins   â¢ Cardiac surgery      CABG x 4   â¢ Carpal tunnel release  appro. 1986    Carpal Tunnel right   â¢ Colon surgery     â¢ Colonoscopy diagnostic  03/19/2010    Colonoscopy, Dx   â¢ D and c      D&C   â¢ Dexa bone density axial skeleton  10/06/2011   â¢ Ir inferior vena cava (ivc) filter  09/12/2016   â¢ Ir ivc filter retrieval/removal s&i N/A 11/09/2017   â¢ Laser surgery of eye Left 01/01/1998    Dr. Tory Craven   â¢ Pap smear,routine  04/20/2009   â¢ Skin cancer excision Left approx. 2015    forehead   â¢ Total knee replacement  approx.  2013    Knee Replacement bilateral     Social History     Socioeconomic History   â¢ Marital status: /Civil Union     Spouse name: Gretel latham Number of children: 6   â¢ Years of education: None   â¢ Highest education level: None   Social Needs   â¢ Financial resource strain: None   â¢ Food insecurity - worry: None   â¢ Food insecurity - inability: None   â¢ Transportation needs - medical: None   â¢ Transportation needs - non-medical: None   Occupational History   â¢ Occupation: Retired   Tobacco Use   â¢ Smoking status: Former Smoker     Packs/day: 1.00     Years: 10.00     Pack years: 10.00     Types: Cigarettes     Last attempt to quit: 1961     Years since quittin.0   â¢ Smokeless tobacco: Never Used   Substance and Sexual Activity   â¢ Alcohol use: Yes     Alcohol/week: 0.0 oz     Comment: occasionally, approx. 1-2 drinks/week (wine/liquor)   â¢ Drug use: No   â¢ Sexual activity: None   Other Topics Concern   â¢  Service Not Asked   â¢ Blood Transfusions Yes   â¢ Caffeine Concern Not Asked   â¢ Occupational Exposure Not Asked   â¢ Hobby Hazards Not Asked   â¢ Sleep Concern Not Asked   â¢ Stress Concern Not Asked   â¢ Weight Concern Not Asked   â¢ Special Diet Not Asked   â¢ Back Care Not Asked   â¢ Exercise Not Asked   â¢ Bike Helmet Not Asked   â¢ Seat Belt Not Asked   â¢ Self-Exams Not Asked   Social History Narrative   â¢ None     Social History     Tobacco Use   Smoking Status Former Smoker   â¢ Packs/day: 1.00   â¢ Years: 10.00   â¢ Pack years: 10.00   â¢ Types: Cigarettes   â¢ Last attempt to quit: 1961   â¢ Years since quittin.0   Smokeless Tobacco Never Used     Social History     Substance and Sexual Activity   Alcohol Use Yes   â¢ Alcohol/week: 0.0 oz    Comment: occasionally, approx.  1-2 drinks/week (wine/liquor)     Family History   Adopted: Yes   Problem Relation Age of Onset   â¢ Stroke Mother    â¢ Heart disease Father    â¢ Cancer Brother         Type unknown   â¢ Hypertension Daughter    â¢ Hypertension Son    â¢ Heart disease Brother    â¢ Hypertension Brother      Health Maintenance   Topic Date Due   â¢ Shingles Vaccine (1 of 2) 06/07/1983   â¢ Medicare Wellness 65+  08/27/2019   â¢ Depression Screening  08/27/2019   â¢ DTaP/Tdap/Td Vaccine (2 - Td) 02/18/2026   â¢ Osteoporosis Screening  Completed   â¢ Influenza Vaccine  Completed   â¢ Pneumococcal Vaccine 65+ High/Highest Risk  Completed       PHYSICAL EXAM:   Physical Exam   Constitutional: She is oriented to person, place, and time and well-developed, well-nourished, and in no distress. No distress. HENT:   Head: Normocephalic and atraumatic. Eyes: Conjunctivae are normal. Right eye exhibits no discharge. Left eye exhibits no discharge. No scleral icterus. Neck: Neck supple. No tracheal deviation present. No thyromegaly present. Cardiovascular: Normal rate and regular rhythm. Exam reveals no gallop and no friction rub. No murmur heard. Pulmonary/Chest: Effort normal and breath sounds normal. No respiratory distress. She has no wheezes. She has no rales. Musculoskeletal:        Left wrist: She exhibits swelling (mild). She exhibits normal range of motion (painful). Mild warmth noted. Lymphadenopathy:     She has no cervical adenopathy. Neurological: She is alert and oriented to person, place, and time. No cranial nerve deficit. Skin: Skin is warm and dry. She is not diaphoretic. Psychiatric: Mood, memory, affect and judgment normal.         LAB RESULTS:   All pertinent laboratory results were reviewed. ASSESSMENT:   1. Left hand/elbow pain    2. Hypertensive heart disease    3. Hx pulmonary embolism    4. Hypercholesterolemia    5. Encounter for therapeutic drug monitoring    6. Long term (current) use of anticoagulants        PLAN:   Orders Placed This Encounter   â¢ CBC & Auto Differential   â¢ Basic Metabolic Panel   â¢ Uric Acid   â¢ INR - Point of Care   â¢ SERVICE TO [facility] HOME DRAW   â¢ predniSONE (DELTASONE) 20 MG tablet       1. Left hand/elbow pain: Likely gout. Patient provided with splint and is to start prednisone 20 mg by mouth twice daily for 5 days. STAT CBC, BMP, and uric acid level ordered as well. Further recommendations will be made based on results. 2. Hypertensive heart disease: The current medical regimen of Imdur 60 mg daily and metoprolol 25 mg twice daily is effective; continue present plan and medications. Will continue to monitor and make further recommendations as needed. 3. History of pulmonary embolism: INR is 2.2 in office. Patient is to continue Coumadin 3.5 mg by mouth daily and have INR rechecked in 1 week. Home draw ordered. 4. Hypercholesterolemia: Patient is to continue pravastatin 60 mg daily. Will continue to monitor and make further recommendations as needed. No Follow-up on file. Instructions provided as documented in the after visit summary. The patient and daughter indicated understanding of the diagnosis and agreed with the plan of care. On 1/14/2019, IHarriet RN scribed the services personally performed by Urvashi Mcneal MD.    I attest that I performed all of the work for this encounter, and the scribe merely recorded my findings.  Urvashi Mcneal MD

## 2023-02-02 NOTE — TELEPHONE ENCOUNTER
----- Message from Mirta Isaac sent at 2/2/2023 10:21 AM CST -----  Type:  Needs Medical Advice    Who Called: Beena PASCUAL  Symptoms (please be specific):  possible UTI    How long has patient had these symptoms:   this morning   Pharmacy name and phone #:    [id number] - [place] - [place], [place] - [address]  [address]  [place] [address]  Phone: [phone] Fax: [phone]      Would the patient rather a call back or a response via [facility]?    Best Call Back Number: [phone]    Additional Information: pt would like to speak w/nurse please call

## 2023-02-07 ENCOUNTER — OFFICE VISIT (OUTPATIENT)
Dept: UROLOGY | Facility: CLINIC | Age: 58
End: 2023-02-07
Payer: COMMERCIAL

## 2023-02-07 ENCOUNTER — HOSPITAL ENCOUNTER (OUTPATIENT)
Dept: RADIOLOGY | Facility: CLINIC | Age: 58
Discharge: HOME | End: 2023-02-07
Attending: UROLOGY
Payer: COMMERCIAL

## 2023-02-07 VITALS — HEIGHT: 69 IN | RESPIRATION RATE: 18 BRPM | BODY MASS INDEX: 20.73 KG/M2 | WEIGHT: 140 LBS

## 2023-02-07 DIAGNOSIS — N20.0 BILATERAL KIDNEY STONES: ICD-10-CM

## 2023-02-07 DIAGNOSIS — N20.0 NEPHROLITHIASIS: Primary | ICD-10-CM

## 2023-02-07 PROCEDURE — 74018 XR ABDOMEN AP 1 VIEW: ICD-10-PCS | Mod: 26,,, | Performed by: RADIOLOGY

## 2023-02-07 PROCEDURE — 1160F RVW MEDS BY RX/DR IN RCRD: CPT | Mod: CPTII,S$GLB,, | Performed by: UROLOGY

## 2023-02-07 PROCEDURE — 74018 RADEX ABDOMEN 1 VIEW: CPT | Mod: TC,,, | Performed by: UROLOGY

## 2023-02-07 PROCEDURE — 3008F BODY MASS INDEX DOCD: CPT | Mod: CPTII,S$GLB,, | Performed by: UROLOGY

## 2023-02-07 PROCEDURE — 99213 PR OFFICE/OUTPT VISIT, EST, LEVL III, 20-29 MIN: ICD-10-PCS | Mod: S$GLB,,, | Performed by: UROLOGY

## 2023-02-07 PROCEDURE — 3008F PR BODY MASS INDEX (BMI) DOCUMENTED: ICD-10-PCS | Mod: CPTII,S$GLB,, | Performed by: UROLOGY

## 2023-02-07 PROCEDURE — 1160F PR REVIEW ALL MEDS BY PRESCRIBER/CLIN PHARMACIST DOCUMENTED: ICD-10-PCS | Mod: CPTII,S$GLB,, | Performed by: UROLOGY

## 2023-02-07 PROCEDURE — 74018 RADEX ABDOMEN 1 VIEW: CPT | Mod: 26,,, | Performed by: RADIOLOGY

## 2023-02-07 PROCEDURE — 1159F PR MEDICATION LIST DOCUMENTED IN MEDICAL RECORD: ICD-10-PCS | Mod: CPTII,S$GLB,, | Performed by: UROLOGY

## 2023-02-07 PROCEDURE — 74018 XR ABDOMEN AP 1 VIEW: ICD-10-PCS | Mod: TC,,, | Performed by: UROLOGY

## 2023-02-07 PROCEDURE — 1159F MED LIST DOCD IN RCRD: CPT | Mod: CPTII,S$GLB,, | Performed by: UROLOGY

## 2023-02-07 PROCEDURE — 99213 OFFICE O/P EST LOW 20 MIN: CPT | Mod: S$GLB,,, | Performed by: UROLOGY

## 2023-02-07 RX ORDER — MESALAMINE 1.2 G/1
2 TABLET, DELAYED RELEASE ORAL 2 TIMES DAILY
COMMUNITY
Start: 2022-11-17

## 2023-02-07 NOTE — PROGRESS NOTES
Subjective:       Patient ID: Beena PASCUAL is a 57 y.o. female.    Chief Complaint: Yrly, Nephrolithiasis, and B Flank pain - moslty L      HPI:  57-year-old female with a history of stones she is had some stone procedures in the past.  Here in yearly follow-up for KUB there is appears to be a small stone nonobstructing    Past Medical History:   Past Medical History:   Diagnosis Date    Colon abnormality     GERD (gastroesophageal reflux disease)     Hypertension     Renal calculi        Past Surgical Historical:   Past Surgical History:   Procedure Laterality Date    CERVICAL SPINE SURGERY       SECTION      COLON SURGERY      COLONOSCOPY W/ BIOPSIES  10/2021    LUMBAR SPINE SURGERY      URETEROLITHOTOMY          Medications:   Medication List with Changes/Refills   Current Medications    ARIPIPRAZOLE (ABILIFY) 2 MG TAB    Take 2 mg by mouth once daily.    AZELAIC ACID (AZELEX) 15 % GEL        BINAXNOW COVID-19 AG SELF TEST KIT    TEST AS DIRECTED TODAY    BIOTIN 5 MG CAP    Take 1 capsule by mouth once daily.    BUPROPION (WELLBUTRIN XL) 300 MG 24 HR TABLET        FLUOXETINE 20 MG CAPSULE        GAVILYTE-G 236-22.74-6.74 -5.86 GRAM SUSPENSION        HYDROCHLOROTHIAZIDE (HYDRODIURIL) 12.5 MG TAB        MESALAMINE (LIALDA) 1.2 GRAM TBEC    Take 2,400 mg by mouth.    MESALAMINE (LIALDA) 1.2 GRAM TBEC    Take 2 tablets by mouth 2 (two) times daily.    MULTIVITAMIN CAPSULE    Take 1 capsule by mouth once daily.    OMEPRAZOLE (PRILOSEC) 40 MG CAPSULE    Take 40 mg by mouth once daily.    POLYETHYLENE GLYCOL (GOLYTELY) 236-22.74-6.74 -5.86 GRAM SUSPENSION    Use as directed by ordering provider.    TRAZODONE (DESYREL) 50 MG TABLET            Past Social History:   Social History     Socioeconomic History    Marital status:    Tobacco Use    Smoking status: Never    Smokeless tobacco: Never   Substance and Sexual Activity    Alcohol use: Not Currently    Drug use: Never       Allergies: Review of patient's  allergies indicates:  No Known Allergies     Family History:   Family History   Problem Relation Age of Onset    Prostate cancer Father         Review of Systems:  Review of Systems   Constitutional:  Negative for activity change and appetite change.   HENT:  Negative for congestion and dental problem.    Eyes:  Negative for pain and discharge.   Respiratory:  Negative for chest tightness and shortness of breath.    Cardiovascular:  Negative for chest pain.   Gastrointestinal:  Negative for abdominal distention and abdominal pain.   Endocrine: Negative for cold intolerance, heat intolerance and polyuria.   Genitourinary:  Negative for decreased urine volume, difficulty urinating, dyspareunia, dysuria, enuresis, flank pain, frequency, genital sores, hematuria, menstrual problem, pelvic pain, urgency, vaginal bleeding, vaginal discharge and vaginal pain.   Musculoskeletal:  Negative for back pain and neck pain.   Skin:  Negative for color change and rash.   Allergic/Immunologic: Negative for immunocompromised state.   Neurological:  Negative for dizziness.   Hematological:  Negative for adenopathy.   Psychiatric/Behavioral:  Negative for agitation, behavioral problems and confusion.      Physical Exam:  Physical Exam  Constitutional:       Appearance: She is well-developed.   HENT:      Head: Normocephalic and atraumatic.      Right Ear: External ear normal.      Left Ear: External ear normal.   Eyes:      Conjunctiva/sclera: Conjunctivae normal.   Neck:      Vascular: No JVD.   Cardiovascular:      Rate and Rhythm: Normal rate and regular rhythm.   Pulmonary:      Effort: Pulmonary effort is normal. No respiratory distress.      Breath sounds: Normal breath sounds. No wheezing.   Abdominal:      General: There is no distension.      Palpations: Abdomen is soft.      Tenderness: There is no abdominal tenderness. There is no rebound.   Musculoskeletal:         General: Normal range of motion.      Cervical back: Normal  range of motion.   Skin:     General: Skin is warm and dry.      Findings: No erythema or rash.   Neurological:      Mental Status: She is alert and oriented to person, place, and time.   Psychiatric:         Behavior: Behavior normal.       Assessment/Plan:       Problem List Items Addressed This Visit          Renal/    Bilateral kidney stones    Relevant Orders    X-Ray Abdomen AP 1 View (Completed)     Other Visit Diagnoses       Nephrolithiasis    -  Primary               Nephrolithiasis with some small stones nonobstructing patient is asymptomatic:   I have instructed the patient to return to clinic as needed if she has any stone pain at that time we will intervene surgically

## 2023-02-23 ENCOUNTER — CLINICAL SUPPORT (OUTPATIENT)
Dept: UROLOGY | Facility: CLINIC | Age: 58
End: 2023-02-23
Payer: COMMERCIAL

## 2023-02-23 ENCOUNTER — TELEPHONE (OUTPATIENT)
Dept: UROLOGY | Facility: CLINIC | Age: 58
End: 2023-02-23

## 2023-02-23 DIAGNOSIS — R82.90 ABNORMAL URINALYSIS: ICD-10-CM

## 2023-02-23 DIAGNOSIS — R30.0 DYSURIA: Primary | ICD-10-CM

## 2023-02-23 NOTE — PROGRESS NOTES
UA drop off    Nit neg  June trace  Wbc 25-50  Rbc 0-3  Epith rare  Bacti 1+    Will send for culture. Pt notified. Lpn

## 2023-02-23 NOTE — TELEPHONE ENCOUNTER
Informed pt if she cant make it to our office for drop off she will have to go to an urgent care etc. Pt verbalized understanding and asked to be put on the schedule for UA drop off this afternoon. Appt made. Sigifredo

## 2023-02-23 NOTE — TELEPHONE ENCOUNTER
"Pt called and reports freq/burning. Thinks it is an uti. Has "medication" that makes her urine orange but almost out. Lives to far away to do a drop off. I advised I would message Dr Hair staff.  "

## 2023-02-25 LAB — URINE CULTURE, ROUTINE: NORMAL

## 2023-02-28 DIAGNOSIS — N39.0 URINARY TRACT INFECTION WITHOUT HEMATURIA, SITE UNSPECIFIED: Primary | ICD-10-CM

## 2023-02-28 RX ORDER — SULFAMETHOXAZOLE AND TRIMETHOPRIM 800; 160 MG/1; MG/1
1 TABLET ORAL 2 TIMES DAILY
Qty: 14 TABLET | Refills: 0 | Status: SHIPPED | OUTPATIENT
Start: 2023-02-28 | End: 2023-03-07

## 2023-06-18 NOTE — PROGRESS NOTES
[facility] records. Also sent to [place] NP with Dr. Walton regarding prior endoscopy and pathology reports.  NBP

## 2023-07-13 ENCOUNTER — TELEPHONE (OUTPATIENT)
Dept: GASTROENTEROLOGY | Facility: CLINIC | Age: 58
End: 2023-07-13
Payer: COMMERCIAL

## 2023-07-13 NOTE — TELEPHONE ENCOUNTER
----- Message from Mirta Isaac sent at 7/13/2023  9:56 AM CDT -----  Type:  Needs Medical Advice    Who Called: Beena PASCUAL  Symptoms (please be specific): -   How long has patient had these symptoms:  -  Pharmacy name and phone #:  -  Would the patient rather a call back or a response via [facility]?    Best Call Back Number: [phone]    Additional Information: pt needs to gert scheduled ( needs sooner appt) next avail is 02/24 please call

## 2023-07-13 NOTE — TELEPHONE ENCOUNTER
I spoke to patient and she is requesting to be seen soon.  She states she saw Dr Walton 3 weeks ago and is scheduled for a Colon resection in October.  She states she needs to be seen for her Pyloric Stenosis and can't wait til Feb.  She c/o pain in the upper left abdominal quadrant , nausea, and constipation.   Last time she saw you was Feb 2021.

## 2023-07-16 NOTE — TELEPHONE ENCOUNTER
Per Dr. Walton' nurse practitioner [place] the patient is pending colon resection surgery.  Has pyloric stenosis with worsening reflux symptoms.  The last upper endoscopy performed with the patient was in 2020 and she had no signs of pyloric stenosis at that time.  Will need evaluation for repeat upper endoscopy.    Okay to make next available follow-up office visit with nurse practitioner.  Please ask patient to provide any other medical records.  I do have access to her [facility] records.  STEPHAN

## 2023-07-18 ENCOUNTER — TELEPHONE (OUTPATIENT)
Dept: FAMILY MEDICINE | Facility: CLINIC | Age: 58
End: 2023-07-18
Payer: COMMERCIAL

## 2023-07-18 NOTE — TELEPHONE ENCOUNTER
Please make an appt with one of the Nps per Dr Huang with dx of Pyloric Stenosis.  She will bring records from [facility].  Thanks

## 2023-07-18 NOTE — TELEPHONE ENCOUNTER
I spoke to patient and she said she had a EGD done in May at [facility] and she will pick them up to bring to her appt with the NP.

## 2023-11-14 ENCOUNTER — TELEPHONE (OUTPATIENT)
Dept: UROLOGY | Facility: CLINIC | Age: 58
End: 2023-11-14
Payer: COMMERCIAL

## 2023-11-14 NOTE — TELEPHONE ENCOUNTER
Attempted to return call to pt, left vm.     ----- Message from Hawa Thomas sent at 11/14/2023  9:26 AM CST -----  Regarding: Hospital  Contact: patient  Per phone call with patient, she stated that she is on her way to the hospital for a kidney stone and wanted the physician to know.  Please return call at [phone] .    Thanks,  SJ

## 2023-11-22 ENCOUNTER — TELEPHONE (OUTPATIENT)
Dept: UROLOGY | Facility: CLINIC | Age: 58
End: 2023-11-22
Payer: COMMERCIAL

## 2023-11-22 NOTE — TELEPHONE ENCOUNTER
Spoke with pt and informed her that there is not a provider in office, she states she will proceed to the ER.     ----- Message from Karla Cain sent at 11/22/2023  4:00 PM CST -----  Contact: Beena Rivera is calling in regards to getting a prescription called in for frequent urinating, not burning just a lot of pressure.please call back at .[phone]           .  [facility]  [email or web address]  [facility] [place] [address] [phone]          Thanks  [facility]

## 2023-12-01 ENCOUNTER — TELEPHONE (OUTPATIENT)
Dept: UROLOGY | Facility: CLINIC | Age: 58
End: 2023-12-01
Payer: COMMERCIAL

## 2023-12-01 NOTE — TELEPHONE ENCOUNTER
Contacted pt. She is at [facility] and experiencing pain. She has a 3.9mm. advised that EHR is on call if need be they will contact him. Pt verbalized understanding. BJP    ----- Message from Francine Capps sent at 12/1/2023 12:42 PM CST -----  Contact: Beena Rivera is calling in regards to being on her way to the ER. Please call back at [phone]                  Thanks  SW

## 2023-12-05 ENCOUNTER — TELEPHONE (OUTPATIENT)
Dept: UROLOGY | Facility: CLINIC | Age: 58
End: 2023-12-05

## 2023-12-05 ENCOUNTER — OFFICE VISIT (OUTPATIENT)
Dept: UROLOGY | Facility: CLINIC | Age: 58
End: 2023-12-05
Payer: COMMERCIAL

## 2023-12-05 DIAGNOSIS — N20.0 NEPHROLITHIASIS: Primary | ICD-10-CM

## 2023-12-05 DIAGNOSIS — N20.0 KIDNEY STONE: Primary | ICD-10-CM

## 2023-12-05 PROCEDURE — 3044F PR MOST RECENT HEMOGLOBIN A1C LEVEL <7.0%: ICD-10-PCS | Mod: CPTII,S$GLB,, | Performed by: UROLOGY

## 2023-12-05 PROCEDURE — 1159F PR MEDICATION LIST DOCUMENTED IN MEDICAL RECORD: ICD-10-PCS | Mod: CPTII,S$GLB,, | Performed by: UROLOGY

## 2023-12-05 PROCEDURE — 99215 PR OFFICE/OUTPT VISIT, EST, LEVL V, 40-54 MIN: ICD-10-PCS | Mod: S$GLB,,, | Performed by: UROLOGY

## 2023-12-05 PROCEDURE — 1159F MED LIST DOCD IN RCRD: CPT | Mod: CPTII,S$GLB,, | Performed by: UROLOGY

## 2023-12-05 PROCEDURE — 99215 OFFICE O/P EST HI 40 MIN: CPT | Mod: S$GLB,,, | Performed by: UROLOGY

## 2023-12-05 PROCEDURE — 3044F HG A1C LEVEL LT 7.0%: CPT | Mod: CPTII,S$GLB,, | Performed by: UROLOGY

## 2023-12-05 RX ORDER — OXYCODONE AND ACETAMINOPHEN 10; 325 MG/1; MG/1
1 TABLET ORAL EVERY 6 HOURS PRN
COMMUNITY
Start: 2023-12-01 | End: 2023-12-06

## 2023-12-05 RX ORDER — HYDROCODONE BITARTRATE AND ACETAMINOPHEN 7.5; 325 MG/1; MG/1
TABLET ORAL
COMMUNITY
Start: 2023-09-12 | End: 2023-12-06

## 2023-12-05 RX ORDER — CLASCOTERONE 1 G/100G
CREAM TOPICAL
COMMUNITY
Start: 2023-08-29

## 2023-12-05 RX ORDER — PHENTERMINE HYDROCHLORIDE 37.5 MG/1
37.5 CAPSULE ORAL EVERY MORNING
COMMUNITY

## 2023-12-05 RX ORDER — SPIRONOLACTONE 50 MG/1
TABLET, FILM COATED ORAL
COMMUNITY
Start: 2023-11-21

## 2023-12-05 RX ORDER — ONDANSETRON 4 MG/1
TABLET, FILM COATED ORAL
COMMUNITY
Start: 2023-12-01

## 2023-12-05 RX ORDER — ASCORBIC ACID 500 MG
500 TABLET ORAL
COMMUNITY

## 2023-12-05 RX ORDER — ZOLPIDEM TARTRATE 10 MG/1
10 TABLET ORAL NIGHTLY
COMMUNITY

## 2023-12-05 RX ORDER — TAMSULOSIN HYDROCHLORIDE 0.4 MG/1
1 CAPSULE ORAL NIGHTLY
COMMUNITY
Start: 2023-12-01

## 2023-12-05 RX ORDER — LANOLIN ALCOHOL/MO/W.PET/CERES
1000 CREAM (GRAM) TOPICAL
COMMUNITY

## 2023-12-05 RX ORDER — PANTOPRAZOLE SODIUM 40 MG/1
40 TABLET, DELAYED RELEASE ORAL
COMMUNITY
Start: 2023-10-21

## 2023-12-05 RX ORDER — DOCUSATE SODIUM 100 MG/1
200 CAPSULE, LIQUID FILLED ORAL
COMMUNITY

## 2023-12-05 NOTE — TELEPHONE ENCOUNTER
Pt calling with persistent kidney pain, she went to ER twice this week and a CT was done. Dr. Hair reviewed CT and we will set pt up for R-URS.     ----- Message from Ashleigh More sent at 12/5/2023  8:22 AM CST -----  Contact: Patient  Type:  Same Day Appointment Request    Caller is requesting a same day appointment.  Caller declined first available appointment listed below.    Name of Caller:Beena Arana   When is the first available appointment?12/21  Symptoms:er follow up/kidney stone   Best Call Back Number:[phone]  Additional Information:

## 2023-12-05 NOTE — PROGRESS NOTES
Subjective:       Patient ID: Beena PASCUAL is a 58 y.o. female.    Chief Complaint: Nephrolithiasis      HPI: 58-year-old female patient presenting to the clinic with a complaint of right flank pain.  Patient was seen at [facility] ER.  CT done noting several bilateral nonobstructing renal calculi as well as 3.9 mm distal right ureteral calculus.       Past Medical History:   Past Medical History:   Diagnosis Date    Colon abnormality     GERD (gastroesophageal reflux disease)     Hypertension     Renal calculi        Past Surgical Historical:   Past Surgical History:   Procedure Laterality Date    CERVICAL SPINE SURGERY       SECTION      COLON SURGERY      COLONOSCOPY W/ BIOPSIES  10/2021    LUMBAR SPINE SURGERY      URETEROLITHOTOMY          Medications:   Medication List with Changes/Refills   Current Medications    ARIPIPRAZOLE (ABILIFY) 2 MG TAB    Take 2 mg by mouth once daily.    ASCORBIC ACID, VITAMIN C, (VITAMIN C) 500 MG TABLET    Take 500 mg by mouth.    AZELAIC ACID (AZELEX) 15 % GEL        BINAXNOW COVID-19 AG SELF TEST KIT    TEST AS DIRECTED TODAY    BIOTIN 5 MG CAP    Take 1 capsule by mouth once daily.    BUPROPION (WELLBUTRIN XL) 300 MG 24 HR TABLET        CYANOCOBALAMIN (VITAMIN B-12) 1000 MCG TABLET    1,000 mcg.    DOCUSATE SODIUM (COLACE) 100 MG CAPSULE    Take 200 mg by mouth.    FLUOXETINE 20 MG CAPSULE        HYDROCODONE-ACETAMINOPHEN (NORCO) 7.5-325 MG PER TABLET        MESALAMINE (LIALDA) 1.2 GRAM TBEC    Take 2 tablets by mouth 2 (two) times daily.    MULTIVITAMIN CAPSULE    Take 1 capsule by mouth once daily.    OMEPRAZOLE (PRILOSEC) 40 MG CAPSULE    Take 40 mg by mouth once daily.    ONDANSETRON (ZOFRAN) 4 MG TABLET    TAKE 1 TABLET BY MOUTH EVERY 8 HOURS AS NEEDED FOR  NAUSEA AND VOMITING    OXYCODONE-ACETAMINOPHEN (PERCOCET)  MG PER TABLET    Take 1 tablet by mouth every 6 (six) hours as needed.    PANTOPRAZOLE (PROTONIX) 40 MG TABLET    Take 40 mg by mouth. For 30 days     PHENTERMINE 37.5 MG CAPSULE    Take 37.5 mg by mouth every morning.    SPIRONOLACTONE (ALDACTONE) 50 MG TABLET        TAMSULOSIN (FLOMAX) 0.4 MG CAP    Take 1 capsule by mouth every evening.    TRAZODONE (DESYREL) 50 MG TABLET        WINLEVI 1 % CREA    APPLY TWICE A DAY    ZOLPIDEM (AMBIEN) 10 MG TAB    Take 10 mg by mouth every evening.   Discontinued Medications    GAVILYTE-G 236-22.74-6.74 -5.86 GRAM SUSPENSION        HYDROCHLOROTHIAZIDE (HYDRODIURIL) 12.5 MG TAB        POLYETHYLENE GLYCOL (GOLYTELY) 236-22.74-6.74 -5.86 GRAM SUSPENSION    Use as directed by ordering provider.        Past Social History:   Social History     Socioeconomic History    Marital status:    Tobacco Use    Smoking status: Never    Smokeless tobacco: Never   Substance and Sexual Activity    Alcohol use: Not Currently    Drug use: Never       Allergies: Review of patient's allergies indicates:  No Known Allergies     Family History:   Family History   Problem Relation Age of Onset    Prostate cancer Father         Review of Systems:  Review of Systems   Constitutional:  Negative for activity change, appetite change, chills, diaphoresis, fatigue, fever and unexpected weight change.   HENT:  Negative for congestion, dental problem, drooling, ear discharge, ear pain, facial swelling, hearing loss, mouth sores, nosebleeds, postnasal drip, rhinorrhea, sinus pressure, sinus pain, sneezing, sore throat, tinnitus, trouble swallowing and voice change.    Eyes:  Negative for photophobia, pain, discharge, redness, itching and visual disturbance.   Respiratory:  Negative for apnea, cough, choking, chest tightness, shortness of breath, wheezing and stridor.    Cardiovascular:  Negative for chest pain and leg swelling.   Gastrointestinal:  Negative for abdominal distention, abdominal pain, anal bleeding, blood in stool, constipation, diarrhea, nausea, rectal pain and vomiting.   Endocrine: Negative for cold intolerance, heat intolerance,  polydipsia, polyphagia and polyuria.   Genitourinary:  Positive for flank pain. Negative for decreased urine volume, difficulty urinating, dyspareunia, dysuria, enuresis, frequency, genital sores, hematuria, menstrual problem, pelvic pain, urgency, vaginal bleeding, vaginal discharge and vaginal pain.   Musculoskeletal:  Negative for arthralgias, back pain, gait problem, joint swelling, myalgias, neck pain and neck stiffness.   Skin:  Negative for color change, pallor, rash and wound.   Allergic/Immunologic: Negative for environmental allergies, food allergies and immunocompromised state.   Neurological:  Negative for dizziness, tremors, seizures, syncope, facial asymmetry, speech difficulty, weakness, light-headedness, numbness and headaches.   Hematological:  Negative for adenopathy. Does not bruise/bleed easily.   Psychiatric/Behavioral:  Negative for agitation, behavioral problems, confusion, decreased concentration, dysphoric mood, hallucinations, self-injury, sleep disturbance and suicidal ideas. The patient is not nervous/anxious and is not hyperactive.        Physical Exam:  Physical Exam  Exam conducted with a chaperone present.   Constitutional:       Appearance: Normal appearance.   HENT:      Head: Normocephalic.      Nose: Nose normal.      Mouth/Throat:      Mouth: Mucous membranes are moist.      Pharynx: Oropharynx is clear.   Eyes:      Extraocular Movements: Extraocular movements intact.      Conjunctiva/sclera: Conjunctivae normal.      Pupils: Pupils are equal, round, and reactive to light.   Cardiovascular:      Rate and Rhythm: Normal rate and regular rhythm.      Pulses: Normal pulses.      Heart sounds: Normal heart sounds.   Pulmonary:      Effort: Pulmonary effort is normal.      Breath sounds: Normal breath sounds.   Abdominal:      General: Abdomen is flat. Bowel sounds are normal.      Palpations: Abdomen is soft.      Hernia: There is no hernia in the left inguinal area or right  inguinal area.   Genitourinary:     General: Normal vulva.      Pubic Area: No rash or pubic lice.       Labia:         Right: No rash, tenderness, lesion or injury.         Left: No rash, tenderness, lesion or injury.       Urethra: No prolapse, urethral pain, urethral swelling or urethral lesion.      Rectum: Normal.   Musculoskeletal:         General: Normal range of motion.      Cervical back: Normal range of motion and neck supple.   Lymphadenopathy:      Lower Body: No right inguinal adenopathy. No left inguinal adenopathy.   Skin:     General: Skin is warm and dry.      Capillary Refill: Capillary refill takes less than 2 seconds.   Neurological:      General: No focal deficit present.      Mental Status: She is alert and oriented to person, place, and time. Mental status is at baseline.   Psychiatric:         Mood and Affect: Mood normal.         Behavior: Behavior normal.         Thought Content: Thought content normal.         Judgment: Judgment normal.         Assessment/Plan:     Right ureteral stone:  3.9 mm distal right ureteral calculus.  Patient will be set up for right ureteroscopy on 12/06/2023.  Patient has medication for pain control.  Report to the ER if pain worsens or if she develops fever    I, Dr. Corey Hair have seen and personally evaluated the patient. I have formulated the plan reviewed all pertinent imaging and clinical data.  I agree with the nurse practitioner's assessment, and I have personally formulated the plan for this patient's care as described by the midlevel.  Problem List Items Addressed This Visit    None          pt is 36y female, here for lac to upper RT lip after being bit by her father's dog, pt unsure of vaccination status of the dog, unknown last tetanus shot date, full thickness laceration to upper lip noted, bleeding well controlled

## 2023-12-06 ENCOUNTER — OUTSIDE PLACE OF SERVICE (OUTPATIENT)
Dept: UROLOGY | Facility: CLINIC | Age: 58
End: 2023-12-06

## 2023-12-06 DIAGNOSIS — N20.0 KIDNEY STONE: Primary | ICD-10-CM

## 2023-12-06 PROCEDURE — 52332 PR CYSTOSCOPY,INSERT URETERAL STENT: ICD-10-PCS | Mod: 51,RT,, | Performed by: UROLOGY

## 2023-12-06 PROCEDURE — 52352 PR CYSTO/URETERO/PYELOSCOPY, CALCULUS TX: ICD-10-PCS | Mod: RT,,, | Performed by: UROLOGY

## 2023-12-06 PROCEDURE — 52332 CYSTOSCOPY AND TREATMENT: CPT | Mod: 51,RT,, | Performed by: UROLOGY

## 2023-12-06 PROCEDURE — 74420 UROGRAPHY RTRGR +-KUB: CPT | Mod: 26,,, | Performed by: UROLOGY

## 2023-12-06 PROCEDURE — 52352 CYSTOURETERO W/STONE REMOVE: CPT | Mod: RT,,, | Performed by: UROLOGY

## 2023-12-06 PROCEDURE — 74420 PR  X-RAY RETROGRADE PYELOGRAM: ICD-10-PCS | Mod: 26,,, | Performed by: UROLOGY

## 2023-12-06 RX ORDER — HYDROCODONE BITARTRATE AND ACETAMINOPHEN 7.5; 325 MG/1; MG/1
1 TABLET ORAL EVERY 6 HOURS PRN
Qty: 15 TABLET | Refills: 0 | Status: SHIPPED | OUTPATIENT
Start: 2023-12-06

## 2023-12-06 RX ORDER — CIPROFLOXACIN 500 MG/1
500 TABLET ORAL EVERY 12 HOURS
Qty: 6 TABLET | Refills: 0 | Status: SHIPPED | OUTPATIENT
Start: 2023-12-06 | End: 2023-12-09

## 2023-12-07 ENCOUNTER — TELEPHONE (OUTPATIENT)
Dept: UROLOGY | Facility: CLINIC | Age: 58
End: 2023-12-07
Payer: COMMERCIAL

## 2023-12-07 NOTE — TELEPHONE ENCOUNTER
Pt called stating she feels her stent is coming out, I advised pt to go ahead and pull her stent. She will call with any issues or concerns.     ----- Message from Agnes Laurent sent at 12/7/2023  8:07 AM CST -----  Contact: Beena  .Patient is calling to speak with the nurse regarding questions and concerns . Reports stints was put in yesterday and something isn't right and can't sit down . Patient states its burned to urinate and is barley sleeping . Please give patient a call back at .[phone].

## 2024-07-27 NOTE — TELEPHONE ENCOUNTER
----- Message from Isabel Ortiz sent at 2/17/2022  9:21 AM CST -----  Regarding: pt advice  Contact: Pt  Pt states that she may have a kidney stone and wants to know if Dr Hair goes to  [facility]. Please call back at [phone]//thank you acc     3

## 2025-01-30 ENCOUNTER — TELEPHONE (OUTPATIENT)
Dept: UROLOGY | Facility: CLINIC | Age: 60
End: 2025-01-30
Payer: COMMERCIAL

## 2025-01-30 NOTE — TELEPHONE ENCOUNTER
Pt calling with kidney pain, she feels she may have a kidney stone. Pt is not in severe pain, I have scheduled pt for Monday. Pt advised to proceed to er if pain worsens over the weekend.    ----- Message from [employer] sent at 1/30/2025 12:51 PM CST -----  Contact: CELESTE PASCUAL [[id number]]  .Type:  Patient Requesting Call    Who Called:CELESTE PASCUAL [[id number]]  Does the patient know what this is regarding?:sooner appt, kidney stone back pain  Would the patient rather a call back or a response via [facility]? call  Best Call Back Number:.[phone] (home)     Additional Information:

## 2025-02-12 ENCOUNTER — OFFICE VISIT (OUTPATIENT)
Dept: UROLOGY | Facility: CLINIC | Age: 60
End: 2025-02-12
Payer: COMMERCIAL

## 2025-02-12 VITALS
BODY MASS INDEX: 28.79 KG/M2 | HEART RATE: 72 BPM | DIASTOLIC BLOOD PRESSURE: 70 MMHG | SYSTOLIC BLOOD PRESSURE: 132 MMHG | HEIGHT: 68 IN | OXYGEN SATURATION: 98 % | WEIGHT: 190 LBS

## 2025-02-12 DIAGNOSIS — N39.0 URINARY TRACT INFECTION WITHOUT HEMATURIA, SITE UNSPECIFIED: Primary | ICD-10-CM

## 2025-02-12 DIAGNOSIS — N22 CALCULUS OF URINARY TRACT IN DISEASES CLASSIFIED ELSEWHERE: ICD-10-CM

## 2025-02-12 DIAGNOSIS — N20.0 KIDNEY STONE: ICD-10-CM

## 2025-02-12 LAB
BILIRUBIN, UA POC OHS: NEGATIVE
BLOOD, UA POC OHS: ABNORMAL
CLARITY, UA POC OHS: CLEAR
COLOR, UA POC OHS: YELLOW
GLUCOSE, UA POC OHS: NEGATIVE
KETONES, UA POC OHS: NEGATIVE
LEUKOCYTES, UA POC OHS: ABNORMAL
NITRITE, UA POC OHS: POSITIVE
PH, UA POC OHS: 7
PROTEIN, UA POC OHS: NEGATIVE
SPECIFIC GRAVITY, UA POC OHS: 1.01
UROBILINOGEN, UA POC OHS: 0.2

## 2025-02-12 RX ORDER — CIPROFLOXACIN 500 MG/1
500 TABLET ORAL 2 TIMES DAILY
Qty: 20 TABLET | Refills: 0 | Status: SHIPPED | OUTPATIENT
Start: 2025-02-12 | End: 2025-02-22

## 2025-02-12 RX ORDER — OXYBUTYNIN CHLORIDE 5 MG/1
5 TABLET ORAL 3 TIMES DAILY
Qty: 30 TABLET | Refills: 1 | Status: SHIPPED | OUTPATIENT
Start: 2025-02-12

## 2025-02-12 RX ORDER — TAMSULOSIN HYDROCHLORIDE 0.4 MG/1
0.4 CAPSULE ORAL DAILY
Qty: 30 CAPSULE | Refills: 3 | Status: SHIPPED | OUTPATIENT
Start: 2025-02-12 | End: 2026-02-12

## 2025-02-12 NOTE — PROGRESS NOTES
Subjective:       Patient ID: Beena PASCUAL is a 59 y.o. female.    Chief Complaint: Urinary Tract Infection      HPI: 59-year-old female patient presenting to the clinic with a complaint of abdominal pain.  Last CT notes several bilateral nonobstructing renal calculi.  She has a history of as well and ureteroscopy.  She has not had any recent imaging.  She also complains of some dysuria       Past Medical History:   Past Medical History:   Diagnosis Date    Colon abnormality     GERD (gastroesophageal reflux disease)     Hypertension     Recurrent UTI     Renal calculi        Past Surgical Historical:   Past Surgical History:   Procedure Laterality Date    CERVICAL SPINE SURGERY       SECTION      COLON SURGERY      COLONOSCOPY W/ BIOPSIES  10/2021    LUMBAR SPINE SURGERY      URETEROLITHOTOMY          Medications:   Medication List with Changes/Refills   Current Medications    ARIPIPRAZOLE (ABILIFY) 2 MG TAB    Take 2 mg by mouth once daily.    ASCORBIC ACID, VITAMIN C, (VITAMIN C) 500 MG TABLET    Take 500 mg by mouth.    AZELAIC ACID (AZELEX) 15 % GEL        BINAXNOW COVID-19 AG SELF TEST KIT    TEST AS DIRECTED TODAY    BIOTIN 5 MG CAP    Take 1 capsule by mouth once daily.    BUPROPION (WELLBUTRIN XL) 300 MG 24 HR TABLET        CYANOCOBALAMIN (VITAMIN B-12) 1000 MCG TABLET    1,000 mcg.    DOCUSATE SODIUM (COLACE) 100 MG CAPSULE    Take 200 mg by mouth.    FLUOXETINE 20 MG CAPSULE        HYDROCODONE-ACETAMINOPHEN (NORCO) 7.5-325 MG PER TABLET    Take 1 tablet by mouth every 6 (six) hours as needed for Pain.    MESALAMINE (LIALDA) 1.2 GRAM TBEC    Take 2 tablets by mouth 2 (two) times daily.    MULTIVITAMIN CAPSULE    Take 1 capsule by mouth once daily.    OMEPRAZOLE (PRILOSEC) 40 MG CAPSULE    Take 40 mg by mouth once daily.    ONDANSETRON (ZOFRAN) 4 MG TABLET    TAKE 1 TABLET BY MOUTH EVERY 8 HOURS AS NEEDED FOR  NAUSEA AND VOMITING    PANTOPRAZOLE (PROTONIX) 40 MG TABLET    Take 40 mg by mouth. For 30  days    PHENTERMINE 37.5 MG CAPSULE    Take 37.5 mg by mouth every morning.    SPIRONOLACTONE (ALDACTONE) 50 MG TABLET        TAMSULOSIN (FLOMAX) 0.4 MG CAP    Take 1 capsule by mouth every evening.    TRAZODONE (DESYREL) 50 MG TABLET        WINLEVI 1 % CREA    APPLY TWICE A DAY    ZOLPIDEM (AMBIEN) 10 MG TAB    Take 10 mg by mouth every evening.        Past Social History:   Social History     Socioeconomic History    Marital status:    Tobacco Use    Smoking status: Never     Passive exposure: Never    Smokeless tobacco: Never   Substance and Sexual Activity    Alcohol use: Not Currently    Drug use: Never    Sexual activity: Yes     Partners: Male     Social Drivers of Health     Food Insecurity: No Food Insecurity (1/22/2025)    Received from [facility] Vital Sign     Worried About Running Out of Food in the Last Year: Never true     Ran Out of Food in the Last Year: Never true   Transportation Needs: Patient Declined (10/14/2023)    Received from [facility]    IP Exclusion     Is the patient/family able/willing to answer SDOH questions?: No, patient refused   Housing Stability: Patient Declined (10/14/2023)    Received from [facility]    IP Exclusion     Is the patient/family able/willing to answer SDOH questions?: No, patient refused       Allergies: Review of patient's allergies indicates:  No Known Allergies     Family History:   Family History   Problem Relation Name Age of Onset    Prostate cancer Father          Review of Systems:  Review of Systems   Constitutional:  Negative for activity change, appetite change, chills, diaphoresis, fatigue, fever and unexpected weight change.   HENT:  Negative for congestion, dental problem, drooling, ear discharge, ear pain, facial swelling, hearing loss, mouth sores, nosebleeds, postnasal drip, rhinorrhea, sinus pressure, sinus pain, sneezing, sore throat, tinnitus, trouble swallowing and voice change.    Eyes:  Negative for  photophobia, pain, discharge, redness, itching and visual disturbance.   Respiratory:  Negative for apnea, cough, choking, chest tightness, shortness of breath, wheezing and stridor.    Cardiovascular:  Negative for chest pain and leg swelling.   Gastrointestinal:  Negative for abdominal distention, abdominal pain, anal bleeding, blood in stool, constipation, diarrhea, nausea, rectal pain and vomiting.   Endocrine: Negative for cold intolerance, heat intolerance, polydipsia, polyphagia and polyuria.   Genitourinary:  Positive for dysuria and flank pain. Negative for decreased urine volume, difficulty urinating, dyspareunia, enuresis, frequency, genital sores, hematuria, menstrual problem, pelvic pain, urgency, vaginal bleeding, vaginal discharge and vaginal pain.   Musculoskeletal:  Negative for arthralgias, back pain, gait problem, joint swelling, myalgias, neck pain and neck stiffness.   Skin:  Negative for color change, pallor, rash and wound.   Allergic/Immunologic: Negative for environmental allergies, food allergies and immunocompromised state.   Neurological:  Negative for dizziness, tremors, seizures, syncope, facial asymmetry, speech difficulty, weakness, light-headedness, numbness and headaches.   Hematological:  Negative for adenopathy. Does not bruise/bleed easily.   Psychiatric/Behavioral:  Negative for agitation, behavioral problems, confusion, decreased concentration, dysphoric mood, hallucinations, self-injury, sleep disturbance and suicidal ideas. The patient is not nervous/anxious and is not hyperactive.        Physical Exam:  Physical Exam  Exam conducted with a chaperone present.   Constitutional:       Appearance: Normal appearance.   HENT:      Head: Normocephalic.      Nose: Nose normal.      Mouth/Throat:      Mouth: Mucous membranes are moist.      Pharynx: Oropharynx is clear.   Eyes:      Extraocular Movements: Extraocular movements intact.      Conjunctiva/sclera: Conjunctivae normal.       Pupils: Pupils are equal, round, and reactive to light.   Cardiovascular:      Rate and Rhythm: Normal rate and regular rhythm.      Pulses: Normal pulses.      Heart sounds: Normal heart sounds.   Pulmonary:      Effort: Pulmonary effort is normal.      Breath sounds: Normal breath sounds.   Abdominal:      General: Abdomen is flat. Bowel sounds are normal.      Palpations: Abdomen is soft.      Hernia: There is no hernia in the left inguinal area or right inguinal area.   Genitourinary:     General: Normal vulva.      Pubic Area: No rash or pubic lice.       Labia:         Right: No rash, tenderness, lesion or injury.         Left: No rash, tenderness, lesion or injury.       Urethra: No prolapse, urethral pain, urethral swelling or urethral lesion.      Rectum: Normal.   Musculoskeletal:         General: Normal range of motion.      Cervical back: Normal range of motion and neck supple.   Lymphadenopathy:      Lower Body: No right inguinal adenopathy. No left inguinal adenopathy.   Skin:     General: Skin is warm and dry.      Capillary Refill: Capillary refill takes less than 2 seconds.   Neurological:      General: No focal deficit present.      Mental Status: She is alert and oriented to person, place, and time. Mental status is at baseline.   Psychiatric:         Mood and Affect: Mood normal.         Behavior: Behavior normal.         Thought Content: Thought content normal.         Judgment: Judgment normal.         Assessment/Plan:     Kidney stones: Order placed for stat CT.  Instructed patient to report to [facility] emergency room if she has any issues.  Follow up based on CT results.  Flomax refilled    Urinary tract infection:  Order placed for urine culture.  We will send additional Cipro.  Patient started Cipro at home yesterday.    I, Dr. Corey Hair have seen and personally evaluated the patient. I have formulated the plan reviewed all pertinent imaging and clinical data.  I agree with the nurse  practitioner's assessment, and I have personally formulated the plan for this patient's care as described by the midlevel.  Problem List Items Addressed This Visit    None  Visit Diagnoses       Calculus of urinary tract in diseases classified elsewhere    -  Primary    Relevant Orders    CT Renal Stone Study ABD Pelvis WO    Urinary tract infection without hematuria, site unspecified        Kidney stone

## 2025-02-13 ENCOUNTER — TELEPHONE (OUTPATIENT)
Dept: UROLOGY | Facility: CLINIC | Age: 60
End: 2025-02-13
Payer: COMMERCIAL

## 2025-02-13 DIAGNOSIS — R30.0 DYSURIA: Primary | ICD-10-CM

## 2025-02-13 RX ORDER — PHENAZOPYRIDINE HYDROCHLORIDE 200 MG/1
200 TABLET, FILM COATED ORAL 3 TIMES DAILY PRN
Qty: 15 TABLET | Refills: 2 | Status: SHIPPED | OUTPATIENT
Start: 2025-02-13 | End: 2025-02-23

## 2025-02-13 NOTE — TELEPHONE ENCOUNTER
Medication sent to provider to sign and send.    ----- Message from Ariane sent at 2/13/2025 11:04 AM CST -----  Contact: CELESTE PASCUAL [[id number]]  ..Type:  Patient Requesting Call    Who Called:CELESTE PASCUAL [[id number]]  Does the patient know what this is regarding?:Phenazopyridine   Would the patient rather a call back or a response via [facility]? call  Best Call Back Number:[phone]  Additional Information: came on yesterday had 3 medications to be refilled only got 2 of 3. Still need Phenazopyridine to be refilled    [id number] - [place] [address] [address] AT [address] [place] [address]  Phone: [phone] Fax: [phone]    She have not heard back from anyone about taking the CT scan.

## 2025-02-14 ENCOUNTER — TELEPHONE (OUTPATIENT)
Dept: UROLOGY | Facility: CLINIC | Age: 60
End: 2025-02-14
Payer: COMMERCIAL

## 2025-02-14 NOTE — TELEPHONE ENCOUNTER
Notified patient of CT results showing bilateral renal calculi with the right measuring 4 mm & the left measuring 5 mm. No ureteral stone or hydronephrosis present & bladder is normal. Also her urine culture showed no growth of bacteria. If she would like to proceed with intervention then she could be scheduled for an ESWL. Verbalized understanding & stated that she would like to think about proceeding with ESWL over the weekend & let us know next week.                 ----- Message from Shaila Díaz NP sent at 2/14/2025  1:57 PM CST -----  Please notify patient CT results. Bilateral renal calculi. Largest right-sided stone measures 4 mm. Largest   left-sided stone measures 5 mm. No ureteral stone or hydronephrosis. Bladder is normal.      Urine culture also resulted with no growth.  If patient is wanting to proceed with intervention she can be scheduled for ESWL

## 2025-02-17 ENCOUNTER — PATIENT MESSAGE (OUTPATIENT)
Dept: UROLOGY | Facility: CLINIC | Age: 60
End: 2025-02-17
Payer: COMMERCIAL

## 2025-04-02 ENCOUNTER — TELEPHONE (OUTPATIENT)
Dept: UROLOGY | Facility: CLINIC | Age: 60
End: 2025-04-02
Payer: COMMERCIAL

## 2025-04-02 DIAGNOSIS — N22 CALCULUS OF URINARY TRACT IN DISEASES CLASSIFIED ELSEWHERE: Primary | ICD-10-CM

## 2025-04-02 NOTE — TELEPHONE ENCOUNTER
Pt calling c/o pain and states she feels a stone is lodged in her ureter. I spoke with Shaila Díaz NP and we will send pt for a stat CT. CT order placed and we will contact pt once this is authorized. Pt verbalized understanding.     ----- Message from [place] sent at 4/2/2025  8:23 AM CDT -----  Contact: CELESTE PASCUAL [[id number]]  .Type:  Same Day Appointment RequestCaller is requesting a same day appointment.  Caller declined first available appointment listed below.  Name of Caller:CELESTE PASCUAL [[id number]]When is the first available appointment? Apr 9Symptoms:kidney stone f/uBest Call Back Number:[phone] Information:

## 2025-04-03 ENCOUNTER — RESULTS FOLLOW-UP (OUTPATIENT)
Dept: UROLOGY | Facility: CLINIC | Age: 60
End: 2025-04-03

## 2025-04-03 ENCOUNTER — TELEPHONE (OUTPATIENT)
Dept: UROLOGY | Facility: CLINIC | Age: 60
End: 2025-04-03
Payer: COMMERCIAL

## 2025-04-03 NOTE — TELEPHONE ENCOUNTER
Per verbal Order Shaila BOLTON NP contact pt to inform CT results, if pt would like to discuss ESWL she can schedule an appt with Dr. Hair. Advise pt to follow up with PCP in regards to other abnormal findings. Pt verbalized understanding, will follow up with PCP. Scheduled with Dr. Hair 04/15/25.

## 2025-04-04 ENCOUNTER — TELEPHONE (OUTPATIENT)
Dept: UROLOGY | Facility: CLINIC | Age: 60
End: 2025-04-04
Payer: COMMERCIAL

## 2025-04-04 NOTE — TELEPHONE ENCOUNTER
Pt already informed of CT results.     ----- Message from Corey Hair MD sent at 4/3/2025  4:45 PM CDT -----  Pt has small non obstructing stones in the kidney.  No stones in the ureter. If pt would like us to remove the stones, please have he come in to discuss  ----- Message -----  From: Merced Bryant LPN  Sent: 4/2/2025   4:15 PM CDT  To: Corey Hair MD

## 2025-08-14 ENCOUNTER — OFFICE VISIT (OUTPATIENT)
Dept: UROLOGY | Facility: CLINIC | Age: 60
End: 2025-08-14
Payer: COMMERCIAL

## 2025-08-14 VITALS
RESPIRATION RATE: 19 BRPM | WEIGHT: 177 LBS | SYSTOLIC BLOOD PRESSURE: 118 MMHG | HEIGHT: 68 IN | BODY MASS INDEX: 26.83 KG/M2 | DIASTOLIC BLOOD PRESSURE: 69 MMHG | HEART RATE: 101 BPM

## 2025-08-14 DIAGNOSIS — N22 CALCULUS OF URINARY TRACT IN DISEASES CLASSIFIED ELSEWHERE: ICD-10-CM

## 2025-08-14 DIAGNOSIS — N20.0 NEPHROLITHIASIS: Primary | ICD-10-CM

## 2025-08-14 PROCEDURE — 3074F SYST BP LT 130 MM HG: CPT | Mod: CPTII,,, | Performed by: UROLOGY

## 2025-08-14 PROCEDURE — 1160F RVW MEDS BY RX/DR IN RCRD: CPT | Mod: CPTII,,, | Performed by: UROLOGY

## 2025-08-14 PROCEDURE — 99215 OFFICE O/P EST HI 40 MIN: CPT | Mod: S$PBB,,, | Performed by: UROLOGY

## 2025-08-14 PROCEDURE — 3008F BODY MASS INDEX DOCD: CPT | Mod: CPTII,,, | Performed by: UROLOGY

## 2025-08-14 PROCEDURE — 1159F MED LIST DOCD IN RCRD: CPT | Mod: CPTII,,, | Performed by: UROLOGY

## 2025-08-14 PROCEDURE — 3078F DIAST BP <80 MM HG: CPT | Mod: CPTII,,, | Performed by: UROLOGY

## 2025-08-14 RX ORDER — BUPROPION HYDROCHLORIDE 300 MG/1
300 TABLET ORAL EVERY MORNING
COMMUNITY
Start: 2024-10-22

## 2025-08-14 RX ORDER — PHENTERMINE HYDROCHLORIDE 37.5 MG/1
37.5 CAPSULE ORAL EVERY MORNING
COMMUNITY

## 2025-08-14 RX ORDER — ACETAMINOPHEN 500 MG
1 TABLET ORAL EVERY MORNING
COMMUNITY

## 2025-08-14 RX ORDER — INFLIXIMAB-AXXQ 100 MG/10ML
5 INJECTION, POWDER, LYOPHILIZED, FOR SOLUTION INTRAVENOUS
COMMUNITY

## 2025-08-14 RX ORDER — MELOXICAM 15 MG/1
TABLET ORAL
COMMUNITY
Start: 2025-04-07

## 2025-08-14 RX ORDER — HYDROCORTISONE 25 MG/G
CREAM TOPICAL
COMMUNITY
Start: 2025-03-26

## 2025-08-14 RX ORDER — BACLOFEN 10 MG/1
10 TABLET ORAL EVERY MORNING
COMMUNITY
Start: 2024-10-21

## 2025-08-14 RX ORDER — MAGNESIUM GLUCONATE 27 MG(500)
500 TABLET ORAL
COMMUNITY

## 2025-08-14 RX ORDER — OMEPRAZOLE 40 MG/1
40 CAPSULE, DELAYED RELEASE ORAL EVERY MORNING
COMMUNITY
Start: 2025-01-15

## 2025-08-14 RX ORDER — FLUOXETINE 20 MG/1
40 CAPSULE ORAL
COMMUNITY
Start: 2024-10-22

## 2025-08-27 ENCOUNTER — TELEPHONE (OUTPATIENT)
Dept: UROLOGY | Facility: CLINIC | Age: 60
End: 2025-08-27
Payer: COMMERCIAL

## 2025-08-27 DIAGNOSIS — N20.0 NEPHROLITHIASIS: Primary | ICD-10-CM

## 2025-08-29 ENCOUNTER — CLINICAL SUPPORT (OUTPATIENT)
Dept: UROLOGY | Facility: CLINIC | Age: 60
End: 2025-08-29
Payer: COMMERCIAL

## 2025-08-29 DIAGNOSIS — N20.0 KIDNEY STONE: Primary | ICD-10-CM

## 2025-08-29 RX ORDER — ACETAMINOPHEN AND PHENYLEPHRINE HCL 325; 5 MG/1; MG/1
TABLET ORAL
COMMUNITY

## 2025-08-29 RX ORDER — SIMETHICONE 125 MG
500 TABLET,CHEWABLE ORAL EVERY 6 HOURS PRN
COMMUNITY